# Patient Record
Sex: MALE | URBAN - METROPOLITAN AREA
[De-identification: names, ages, dates, MRNs, and addresses within clinical notes are randomized per-mention and may not be internally consistent; named-entity substitution may affect disease eponyms.]

---

## 2019-11-01 ENCOUNTER — OUTPATIENT (OUTPATIENT)
Dept: OUTPATIENT SERVICES | Facility: HOSPITAL | Age: 49
LOS: 1 days | End: 2019-11-01
Payer: MEDICAID

## 2019-12-09 DIAGNOSIS — Z71.89 OTHER SPECIFIED COUNSELING: ICD-10-CM

## 2020-01-01 ENCOUNTER — OUTPATIENT (OUTPATIENT)
Dept: OUTPATIENT SERVICES | Facility: HOSPITAL | Age: 50
LOS: 1 days | End: 2020-01-01
Payer: MEDICAID

## 2020-01-01 PROCEDURE — H0002: CPT

## 2020-03-12 PROBLEM — Z00.00 ENCOUNTER FOR PREVENTIVE HEALTH EXAMINATION: Status: ACTIVE | Noted: 2020-03-12

## 2020-03-23 ENCOUNTER — APPOINTMENT (OUTPATIENT)
Dept: ORTHOPEDIC SURGERY | Facility: CLINIC | Age: 50
End: 2020-03-23

## 2020-04-22 DIAGNOSIS — Z71.89 OTHER SPECIFIED COUNSELING: ICD-10-CM

## 2020-06-09 ENCOUNTER — RESULT REVIEW (OUTPATIENT)
Age: 50
End: 2020-06-09

## 2020-06-09 ENCOUNTER — OUTPATIENT (OUTPATIENT)
Dept: OUTPATIENT SERVICES | Facility: HOSPITAL | Age: 50
LOS: 1 days | End: 2020-06-09
Payer: COMMERCIAL

## 2020-06-09 ENCOUNTER — APPOINTMENT (OUTPATIENT)
Dept: ORTHOPEDIC SURGERY | Facility: CLINIC | Age: 50
End: 2020-06-09
Payer: MEDICAID

## 2020-06-09 VITALS — HEART RATE: 85 BPM | SYSTOLIC BLOOD PRESSURE: 130 MMHG | DIASTOLIC BLOOD PRESSURE: 70 MMHG | OXYGEN SATURATION: 96 %

## 2020-06-09 VITALS — HEIGHT: 69 IN | BODY MASS INDEX: 31.1 KG/M2 | WEIGHT: 210 LBS

## 2020-06-09 DIAGNOSIS — Z87.891 PERSONAL HISTORY OF NICOTINE DEPENDENCE: ICD-10-CM

## 2020-06-09 DIAGNOSIS — Z78.9 OTHER SPECIFIED HEALTH STATUS: ICD-10-CM

## 2020-06-09 DIAGNOSIS — Z60.2 PROBLEMS RELATED TO LIVING ALONE: ICD-10-CM

## 2020-06-09 DIAGNOSIS — Z87.898 PERSONAL HISTORY OF OTHER SPECIFIED CONDITIONS: ICD-10-CM

## 2020-06-09 DIAGNOSIS — Z80.9 FAMILY HISTORY OF MALIGNANT NEOPLASM, UNSPECIFIED: ICD-10-CM

## 2020-06-09 PROCEDURE — 72020 X-RAY EXAM OF SPINE 1 VIEW: CPT | Mod: 26

## 2020-06-09 PROCEDURE — 72020 X-RAY EXAM OF SPINE 1 VIEW: CPT

## 2020-06-09 PROCEDURE — 73564 X-RAY EXAM KNEE 4 OR MORE: CPT

## 2020-06-09 PROCEDURE — 71046 X-RAY EXAM CHEST 2 VIEWS: CPT | Mod: 26

## 2020-06-09 PROCEDURE — 73564 X-RAY EXAM KNEE 4 OR MORE: CPT | Mod: 26,RT

## 2020-06-09 PROCEDURE — 72170 X-RAY EXAM OF PELVIS: CPT

## 2020-06-09 PROCEDURE — 71046 X-RAY EXAM CHEST 2 VIEWS: CPT

## 2020-06-09 PROCEDURE — 72170 X-RAY EXAM OF PELVIS: CPT | Mod: 26,59

## 2020-06-09 PROCEDURE — 73521 X-RAY EXAM HIPS BI 2 VIEWS: CPT

## 2020-06-09 PROCEDURE — 99205 OFFICE O/P NEW HI 60 MIN: CPT

## 2020-06-09 PROCEDURE — 73523 X-RAY EXAM HIPS BI 5/> VIEWS: CPT | Mod: 26

## 2020-06-09 RX ORDER — OXYCODONE 10 MG/1
10 TABLET ORAL
Refills: 0 | Status: ACTIVE | COMMUNITY

## 2020-06-09 RX ORDER — IBUPROFEN 800 MG/1
TABLET, FILM COATED ORAL
Refills: 0 | Status: ACTIVE | COMMUNITY

## 2020-06-09 SDOH — SOCIAL STABILITY - SOCIAL INSECURITY: PROBLEMS RELATED TO LIVING ALONE: Z60.2

## 2020-06-09 NOTE — DISCUSSION/SUMMARY
[de-identified] : 48y/o male with severe right hip osteonecrosis and secondary degenerative joint disease, as well as left hip osteoarthritis and left sciatica\par - Discussed the diagnoses, natural history, and treatment options with him at length. Due to his severe pain and loss of function, he is indicated for right total hip replacement.\par - A discussion was had with the patient regarding a right total hip replacement. Anterior and posterior exposures were discussed. I think that an anterior approach would be appropriate. A long discussion was had with the patient as what the total joint replacement would entail. A model was used to demonstrate the operation and to discuss bearing surfaces of the implants. Implant fixation methods were discussed; my plan is fully cementless fixation in this case. The hospitalization and rehabilitation were discussed. The use of perioperative antibiotics and DVT prophylaxis were discussed. The risks, benefits and alternatives to surgical intervention were discussed at length with the patient. Specific risks discussed included: infection, wound breakdown, numbness and damage to nerves, tendon, muscle, arteries or other blood vessels, and the possibility of leg length discrepancy. The possibility of recurrent pain, no improvement in pain and actual worsening of the pain were also mentioned in conversation with the patient. Medical complications related to the patient's general medical health including deep vein thrombosis, pulmonary embolus, heart attack, stroke, death and other complications from anesthesia were discussed as well. The patient was told that we will take steps to minimize these risks by using sterile technique, antibiotics and DVT prophylaxis when appropriate and following the patient postoperatively in the clinic setting to monitor progress. The benefits of surgery were discussed with the patient including the potential to improve the current clinical condition through operative intervention. Alternatives to surgical intervention include continued conservative management which may yield less than optimal results in this particular patient. All questions were answered to the satisfaction of the patient. \par - We discussed the importance of ongoing smoking cessation as well as abstinence from excessive alcohol consumption and illicit drug use. Cotinine and illicit drug panel will be obtained preoperatively.\par - I explained that the right BERNARD will not address the left sided symptoms, including the sciatica. \par - Preop medical clearance and joints class\par - Will schedule surgery for a convenient date\par - He was advised to follow up with his pain management physician prior to the operation for further sciatica treatment. He declined referral to another spine specialist.

## 2020-06-09 NOTE — HISTORY OF PRESENT ILLNESS
[Worsening] : worsening [___ yrs] : [unfilled] year(s) ago [8] : a current pain level of 8/10 [Standing] : standing [Bending] : worsened by bending [Constant] : ~He/She~ states the symptoms seem to be constant [Hip Movement] : worsened by hip movement [Sitting] : worsened by sitting [Ice] : relieved by ice [Walking] : worsened by walking [Rest] : relieved by rest [de-identified] : 48y/o male presenting for right greater than left hip pain and stiffness. Symptoms have been present since the spring of 2018. No significant injury that he can recall; no other known inciting event. In the latter part of 2018, he was receiving treatment at a different hospital system including home exercise and right hip injections, which he did not find helpful. He was scheduled to undergo right total hip replacement but backed out at the last minute due to fear of the spinal anesthesia. Symptoms have progressed to the point that he is completely reliant on a rollator at all times. He is disabled. He is unable to walk for more than a fraction of a block at a time. He takes ibuprofen, gabapentin, and oxycodone daily for pain. \par \par He reports over a year of severe left sided sciatica that has waxed and waned. He sees a pain management physician and is planning to see him again within a month.\par \par He reports that he has been an alcoholic for most of his adult life. He was a half-pack per day smoker for about 37 years. He also snorted cocaine for years. He denies any prior injection drug use. He has been in and out of rehab for substance abuse. He reports that he has been completely off of all these substances for the past two months.

## 2020-06-09 NOTE — PHYSICAL EXAM
[de-identified] : General appearance: well nourished and hydrated, pleasant, alert and oriented x 3, cooperative.  \par HEENT: normocephalic, EOM intact, nasal septum midline, oral cavity clear, external auditory canal clear.  \par Cardiovascular: no lower leg edema, no varicosities, dorsalis pedis pulses palpable and symmetric.  Fingernails clubbed.\par Lymphatics: no palpable lymphadenopathy, no lymphedema.  \par Neurologic: sensation is normal, no muscle weakness in upper or lower extremities, patella tendon reflexes present and symmetric.  \par Dermatologic: skin moist, warm, no rash.  \par Spine: cervical spine with normal lordosis and painless range of motion, thoracic spine with normal kyphosis and painless range of motion, lumbosacral spine with normal lordosis and painless range of motion.  No tenderness to palpation along midline spine and paraspinal musculature.  Sacroiliac joints nontender bilaterally.\par Gait: severe bilateral coxalgia with rollator. Hunched forward posture during gait.\par \par Difficult to assess clinical limb lengths secondary to right hip flexion contracture.\par \par Left hip:\par - Skin intact, no scars or other prior surgical incisions noted\par - No swelling/ecchymosis\par - No specific tenderness on palpation\par - ROM: 120 flexion, 0 extension, 0 adduction, 30 abduction, 5 obligate extenal rotation, 60 external rotation\par - KIKE painful\par - FADIR painful\par - Booker positive\par - Stinchfield positive\par - Flexor power 5/5\par - Abductor power 5/5\par \par Right hip:\par - Skin intact, no scars or other prior surgical incisions noted\par - No swelling/ecchymosis\par - Diffuse tenderness on palpation\par - ROM: 100 flexion, 30 extension (flexion contracture), 0 adduction, 10 abduction, 10 obligate external rotation, 35 further external rotation\par - KIKE painful\par - FADIR painful\par - Booker positive\par - Stinchfield positive\par - Flexor power 4+/5, limited by pain\par - Abductor power 4+/5, limited by pain [de-identified] : A lateral view of the lumbosacral spine, weightbearing AP pelvis, and 2 additional views (frog lateral and false profile) of the bilateral hips as well as 4 views of the right knee (weightbearing AP, weightbearing [place], weightbearing lateral, and [place]) were obtained today.\par \par Right hip demonstrates advanced osteonecrosis with secondary degenerative joint disease. Loss of superior femoral head contour and sharon collapse. Large cysts on both sides of the joint. No acute fracture.\par \par Left hip demonstrates advanced osteoarthritis with bone on bone superior articulation. No obvious osteonecrosis. Detached left os acetabulum. Abnormal organized bone formation along posteromedial femoral diaphysis; appears old. \par \par Bilateral sacroiliac joints appear normal without arthrosis.\par \par The right knee demonstrates normal alignment in the coronal and sagittal planes. There is no arthritis in the medial or lateral compartments, Kellgren-Jermaine 0. The patella sits at appropriate height and tracks centrally.\par \par The spine demonstrates normal lordosis, though there is flexed forward positioning on the upright radiograph consistent with hip flexion contracture. There is multilevel spondylosis at the caudal levels associated with facet hypertrophy. There is grade 1 anterolisthesis at L4/5 and L5/S1.

## 2020-07-02 ENCOUNTER — APPOINTMENT (OUTPATIENT)
Dept: ORTHOPEDIC SURGERY | Facility: CLINIC | Age: 50
End: 2020-07-02
Payer: MEDICAID

## 2020-07-06 ENCOUNTER — OUTPATIENT (OUTPATIENT)
Dept: OUTPATIENT SERVICES | Facility: HOSPITAL | Age: 50
LOS: 1 days | End: 2020-07-06
Payer: COMMERCIAL

## 2020-07-06 ENCOUNTER — APPOINTMENT (OUTPATIENT)
Dept: ORTHOPEDIC SURGERY | Facility: CLINIC | Age: 50
End: 2020-07-06
Payer: MEDICAID

## 2020-07-06 ENCOUNTER — RESULT REVIEW (OUTPATIENT)
Age: 50
End: 2020-07-06

## 2020-07-06 VITALS
WEIGHT: 210 LBS | TEMPERATURE: 97.8 F | SYSTOLIC BLOOD PRESSURE: 140 MMHG | OXYGEN SATURATION: 97 % | DIASTOLIC BLOOD PRESSURE: 70 MMHG | HEIGHT: 69 IN | BODY MASS INDEX: 31.1 KG/M2 | HEART RATE: 88 BPM

## 2020-07-06 DIAGNOSIS — Z01.818 ENCOUNTER FOR OTHER PREPROCEDURAL EXAMINATION: ICD-10-CM

## 2020-07-06 DIAGNOSIS — M16.12 UNILATERAL PRIMARY OSTEOARTHRITIS, LEFT HIP: ICD-10-CM

## 2020-07-06 LAB
ALBUMIN SERPL ELPH-MCNC: 4.4 G/DL — SIGNIFICANT CHANGE UP (ref 3.3–5)
ALP SERPL-CCNC: 95 U/L — SIGNIFICANT CHANGE UP (ref 40–120)
ALT FLD-CCNC: 24 U/L — SIGNIFICANT CHANGE UP (ref 10–45)
ANION GAP SERPL CALC-SCNC: 12 MMOL/L — SIGNIFICANT CHANGE UP (ref 5–17)
APPEARANCE UR: CLEAR — SIGNIFICANT CHANGE UP
APTT BLD: 33.2 SEC — SIGNIFICANT CHANGE UP (ref 27.5–35.5)
AST SERPL-CCNC: 20 U/L — SIGNIFICANT CHANGE UP (ref 10–40)
BACTERIA # UR AUTO: PRESENT /HPF
BASOPHILS # BLD AUTO: 0.04 K/UL — SIGNIFICANT CHANGE UP (ref 0–0.2)
BASOPHILS NFR BLD AUTO: 0.6 % — SIGNIFICANT CHANGE UP (ref 0–2)
BILIRUB SERPL-MCNC: 0.5 MG/DL — SIGNIFICANT CHANGE UP (ref 0.2–1.2)
BILIRUB UR-MCNC: NEGATIVE — SIGNIFICANT CHANGE UP
BUN SERPL-MCNC: 12 MG/DL — SIGNIFICANT CHANGE UP (ref 7–23)
CALCIUM SERPL-MCNC: 10.2 MG/DL — SIGNIFICANT CHANGE UP (ref 8.4–10.5)
CHLORIDE SERPL-SCNC: 101 MMOL/L — SIGNIFICANT CHANGE UP (ref 96–108)
CO2 SERPL-SCNC: 27 MMOL/L — SIGNIFICANT CHANGE UP (ref 22–31)
COLOR SPEC: YELLOW — SIGNIFICANT CHANGE UP
CREAT SERPL-MCNC: 1.02 MG/DL — SIGNIFICANT CHANGE UP (ref 0.5–1.3)
DIFF PNL FLD: ABNORMAL
EOSINOPHIL # BLD AUTO: 0.14 K/UL — SIGNIFICANT CHANGE UP (ref 0–0.5)
EOSINOPHIL NFR BLD AUTO: 2.2 % — SIGNIFICANT CHANGE UP (ref 0–6)
EPI CELLS # UR: SIGNIFICANT CHANGE UP /HPF (ref 0–5)
GLUCOSE SERPL-MCNC: 88 MG/DL — SIGNIFICANT CHANGE UP (ref 70–99)
GLUCOSE UR QL: NEGATIVE — SIGNIFICANT CHANGE UP
HCT VFR BLD CALC: 41.9 % — SIGNIFICANT CHANGE UP (ref 39–50)
HGB BLD-MCNC: 13.7 G/DL — SIGNIFICANT CHANGE UP (ref 13–17)
IMM GRANULOCYTES NFR BLD AUTO: 0.3 % — SIGNIFICANT CHANGE UP (ref 0–1.5)
INR BLD: 0.98 — SIGNIFICANT CHANGE UP (ref 0.88–1.16)
KETONES UR-MCNC: NEGATIVE — SIGNIFICANT CHANGE UP
LEUKOCYTE ESTERASE UR-ACNC: ABNORMAL
LYMPHOCYTES # BLD AUTO: 2.61 K/UL — SIGNIFICANT CHANGE UP (ref 1–3.3)
LYMPHOCYTES # BLD AUTO: 41.2 % — SIGNIFICANT CHANGE UP (ref 13–44)
MCHC RBC-ENTMCNC: 31.5 PG — SIGNIFICANT CHANGE UP (ref 27–34)
MCHC RBC-ENTMCNC: 32.7 GM/DL — SIGNIFICANT CHANGE UP (ref 32–36)
MCV RBC AUTO: 96.3 FL — SIGNIFICANT CHANGE UP (ref 80–100)
MONOCYTES # BLD AUTO: 0.51 K/UL — SIGNIFICANT CHANGE UP (ref 0–0.9)
MONOCYTES NFR BLD AUTO: 8 % — SIGNIFICANT CHANGE UP (ref 2–14)
NEUTROPHILS # BLD AUTO: 3.02 K/UL — SIGNIFICANT CHANGE UP (ref 1.8–7.4)
NEUTROPHILS NFR BLD AUTO: 47.7 % — SIGNIFICANT CHANGE UP (ref 43–77)
NITRITE UR-MCNC: NEGATIVE — SIGNIFICANT CHANGE UP
NRBC # BLD: 0 /100 WBCS — SIGNIFICANT CHANGE UP (ref 0–0)
PH UR: 6 — SIGNIFICANT CHANGE UP (ref 5–8)
PLATELET # BLD AUTO: 442 K/UL — HIGH (ref 150–400)
POTASSIUM SERPL-MCNC: 4.2 MMOL/L — SIGNIFICANT CHANGE UP (ref 3.5–5.3)
POTASSIUM SERPL-SCNC: 4.2 MMOL/L — SIGNIFICANT CHANGE UP (ref 3.5–5.3)
PROT SERPL-MCNC: 7.1 G/DL — SIGNIFICANT CHANGE UP (ref 6–8.3)
PROT UR-MCNC: NEGATIVE MG/DL — SIGNIFICANT CHANGE UP
PROTHROM AB SERPL-ACNC: 11.8 SEC — SIGNIFICANT CHANGE UP (ref 10.6–13.6)
RBC # BLD: 4.35 M/UL — SIGNIFICANT CHANGE UP (ref 4.2–5.8)
RBC # FLD: 11.9 % — SIGNIFICANT CHANGE UP (ref 10.3–14.5)
RBC CASTS # UR COMP ASSIST: < 5 /HPF — SIGNIFICANT CHANGE UP
SODIUM SERPL-SCNC: 140 MMOL/L — SIGNIFICANT CHANGE UP (ref 135–145)
SP GR SPEC: <=1.005 — SIGNIFICANT CHANGE UP (ref 1–1.03)
UROBILINOGEN FLD QL: 0.2 E.U./DL — SIGNIFICANT CHANGE UP
WBC # BLD: 6.34 K/UL — SIGNIFICANT CHANGE UP (ref 3.8–10.5)
WBC # FLD AUTO: 6.34 K/UL — SIGNIFICANT CHANGE UP (ref 3.8–10.5)
WBC UR QL: < 5 /HPF — SIGNIFICANT CHANGE UP

## 2020-07-06 PROCEDURE — 93010 ELECTROCARDIOGRAM REPORT: CPT

## 2020-07-06 PROCEDURE — 71046 X-RAY EXAM CHEST 2 VIEWS: CPT | Mod: 26

## 2020-07-06 PROCEDURE — 85730 THROMBOPLASTIN TIME PARTIAL: CPT

## 2020-07-06 PROCEDURE — 71046 X-RAY EXAM CHEST 2 VIEWS: CPT

## 2020-07-06 PROCEDURE — 99214 OFFICE O/P EST MOD 30 MIN: CPT

## 2020-07-06 PROCEDURE — 85025 COMPLETE CBC W/AUTO DIFF WBC: CPT

## 2020-07-06 PROCEDURE — 81001 URINALYSIS AUTO W/SCOPE: CPT

## 2020-07-06 PROCEDURE — 93005 ELECTROCARDIOGRAM TRACING: CPT

## 2020-07-06 PROCEDURE — 80053 COMPREHEN METABOLIC PANEL: CPT

## 2020-07-06 PROCEDURE — 85610 PROTHROMBIN TIME: CPT

## 2020-07-06 PROCEDURE — 87086 URINE CULTURE/COLONY COUNT: CPT

## 2020-07-07 LAB
CULTURE RESULTS: NO GROWTH — SIGNIFICANT CHANGE UP
SPECIMEN SOURCE: SIGNIFICANT CHANGE UP

## 2020-07-10 LAB
COTININE SERPL-MCNC: ABNORMAL
DRUG ABUSE PANEL-9, SERUM: NORMAL

## 2020-07-13 NOTE — ADDENDUM
[FreeTextEntry1] : Labs/EKG/Imaging screen reviewed.  No acute abnormalities.  Patient with positive cotinine. Per Ortho, will  delay surgery to 8/14 with repeat testing prior.\par

## 2020-07-13 NOTE — HISTORY OF PRESENT ILLNESS
[Chronic Anticoagulation] : no chronic anticoagulation [Chronic Kidney Disease] : no chronic kidney disease [FreeTextEntry1] : Right Total Hip Arthroplasty [Diabetes] : no diabetes [FreeTextEntry2] : 7/17/2020 [FreeTextEntry3] : Dr. 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 The patient is planned for Right Total Hip Arthroplasty with Dr. Jeremías Longoria on 7/17/2020.\par \par METS >4\par \par The patient denies recent/active cardiopulmonary symptoms including chest pain, shortness of breath, dyspnea of exertion, palpitations, swelling, headache, dizziness, syncope.\par \par The patient denies recent COVID exposure or COVID-related symptoms.\par \par

## 2020-07-13 NOTE — RESULTS/DATA
[de-identified] : pending [de-identified] : pending [de-identified] : pending [de-identified] : pending [de-identified] : pending UA, COVID PCR

## 2020-07-13 NOTE — ASSESSMENT
[Patient Optimized for Surgery] : Patient optimized for surgery [FreeTextEntry4] : The patient is a 49 year old man presenting with right hip pain secondary to hip avascular necrosis . The patient is planned for Right Total Hip Arthroplasty with Dr. Jeremías Longoria on 7/17/2020.\par \par -Labs/Diagnostics reviewed with patient in detail\par -METS >4\par -RCRI = 0, 3.9% 30-day risk of death/MI/cardiac arrest\par \par Patient is at low risk for moderate risk surgery/procedure\par \par Pending labs/diagnostics, Patient is MEDICALLY OPTIMIZED TO PROCEED WITH PROPOSED SURGERY.\par \par \par \par

## 2020-08-01 ENCOUNTER — HOSPITAL ENCOUNTER (INPATIENT)
Dept: HOSPITAL 74 - YASAS | Age: 50
LOS: 4 days | Discharge: OTHER INSTITUTION | End: 2020-08-05
Attending: ALLERGY & IMMUNOLOGY | Admitting: ALLERGY & IMMUNOLOGY
Payer: COMMERCIAL

## 2020-08-01 VITALS — BODY MASS INDEX: 32.5 KG/M2

## 2020-08-01 DIAGNOSIS — F10.230: Primary | ICD-10-CM

## 2020-08-01 DIAGNOSIS — E66.9: ICD-10-CM

## 2020-08-01 DIAGNOSIS — I10: ICD-10-CM

## 2020-08-01 DIAGNOSIS — R26.2: ICD-10-CM

## 2020-08-01 DIAGNOSIS — F14.20: ICD-10-CM

## 2020-08-01 DIAGNOSIS — F19.24: ICD-10-CM

## 2020-08-01 DIAGNOSIS — Z91.013: ICD-10-CM

## 2020-08-01 DIAGNOSIS — M16.0: ICD-10-CM

## 2020-08-01 DIAGNOSIS — Z99.89: ICD-10-CM

## 2020-08-01 DIAGNOSIS — F17.213: ICD-10-CM

## 2020-08-01 PROCEDURE — U0003 INFECTIOUS AGENT DETECTION BY NUCLEIC ACID (DNA OR RNA); SEVERE ACUTE RESPIRATORY SYNDROME CORONAVIRUS 2 (SARS-COV-2) (CORONAVIRUS DISEASE [COVID-19]), AMPLIFIED PROBE TECHNIQUE, MAKING USE OF HIGH THROUGHPUT TECHNOLOGIES AS DESCRIBED BY CMS-2020-01-R: HCPCS

## 2020-08-01 PROCEDURE — HZ2ZZZZ DETOXIFICATION SERVICES FOR SUBSTANCE ABUSE TREATMENT: ICD-10-PCS | Performed by: ALLERGY & IMMUNOLOGY

## 2020-08-01 RX ADMIN — Medication SCH MG: at 22:22

## 2020-08-01 RX ADMIN — HYDROXYZINE PAMOATE SCH MG: 25 CAPSULE ORAL at 21:11

## 2020-08-01 RX ADMIN — GABAPENTIN SCH MG: 300 CAPSULE ORAL at 21:12

## 2020-08-01 RX ADMIN — Medication SCH MG: at 21:12

## 2020-08-02 LAB
ALBUMIN SERPL-MCNC: 3.4 G/DL (ref 3.4–5)
ALP SERPL-CCNC: 84 U/L (ref 45–117)
ALT SERPL-CCNC: 20 U/L (ref 13–61)
ANION GAP SERPL CALC-SCNC: 0 MMOL/L (ref 8–16)
AST SERPL-CCNC: 15 U/L (ref 15–37)
BILIRUB SERPL-MCNC: 0.7 MG/DL (ref 0.2–1)
BUN SERPL-MCNC: 15.5 MG/DL (ref 7–18)
CALCIUM SERPL-MCNC: 9 MG/DL (ref 8.5–10.1)
CHLORIDE SERPL-SCNC: 114 MMOL/L (ref 98–107)
CO2 SERPL-SCNC: 29 MMOL/L (ref 21–32)
CREAT SERPL-MCNC: 1 MG/DL (ref 0.55–1.3)
DEPRECATED RDW RBC AUTO: 13.6 % (ref 11.9–15.9)
GLUCOSE SERPL-MCNC: 91 MG/DL (ref 74–106)
HCT VFR BLD CALC: 41.2 % (ref 35.4–49)
HGB BLD-MCNC: 13.4 GM/DL (ref 11.7–16.9)
MCH RBC QN AUTO: 32.2 PG (ref 25.7–33.7)
MCHC RBC AUTO-ENTMCNC: 32.6 G/DL (ref 32–35.9)
MCV RBC: 98.9 FL (ref 80–96)
PLATELET # BLD AUTO: 338 K/MM3 (ref 134–434)
PMV BLD: 8.4 FL (ref 7.5–11.1)
POTASSIUM SERPLBLD-SCNC: 4 MMOL/L (ref 3.5–5.1)
PROT SERPL-MCNC: 6.5 G/DL (ref 6.4–8.2)
RBC # BLD AUTO: 4.17 M/MM3 (ref 4–5.6)
SODIUM SERPL-SCNC: 143 MMOL/L (ref 136–145)
WBC # BLD AUTO: 4.6 K/MM3 (ref 4–10)

## 2020-08-02 RX ADMIN — Medication SCH MG: at 22:22

## 2020-08-02 RX ADMIN — GABAPENTIN SCH MG: 300 CAPSULE ORAL at 22:22

## 2020-08-02 RX ADMIN — AMLODIPINE BESYLATE SCH MG: 10 TABLET ORAL at 10:22

## 2020-08-02 RX ADMIN — IBUPROFEN PRN MG: 400 TABLET, FILM COATED ORAL at 05:28

## 2020-08-02 RX ADMIN — HYDROXYZINE PAMOATE SCH: 25 CAPSULE ORAL at 10:48

## 2020-08-02 RX ADMIN — GABAPENTIN SCH MG: 300 CAPSULE ORAL at 14:02

## 2020-08-02 RX ADMIN — GABAPENTIN SCH MG: 300 CAPSULE ORAL at 05:27

## 2020-08-02 RX ADMIN — Medication SCH TAB: at 10:23

## 2020-08-02 RX ADMIN — HYDROXYZINE PAMOATE SCH MG: 25 CAPSULE ORAL at 05:28

## 2020-08-03 RX ADMIN — GABAPENTIN SCH MG: 300 CAPSULE ORAL at 13:05

## 2020-08-03 RX ADMIN — Medication SCH TAB: at 10:48

## 2020-08-03 RX ADMIN — Medication SCH MG: at 22:10

## 2020-08-03 RX ADMIN — LISINOPRIL SCH MG: 10 TABLET ORAL at 22:10

## 2020-08-03 RX ADMIN — Medication SCH MG: at 22:11

## 2020-08-03 RX ADMIN — LISINOPRIL SCH MG: 10 TABLET ORAL at 13:04

## 2020-08-03 RX ADMIN — GABAPENTIN SCH MG: 300 CAPSULE ORAL at 05:32

## 2020-08-03 RX ADMIN — AMLODIPINE BESYLATE SCH MG: 10 TABLET ORAL at 10:49

## 2020-08-03 RX ADMIN — IBUPROFEN PRN MG: 400 TABLET, FILM COATED ORAL at 05:32

## 2020-08-03 RX ADMIN — GABAPENTIN SCH MG: 300 CAPSULE ORAL at 22:10

## 2020-08-04 LAB
APPEARANCE UR: CLEAR
BILIRUB UR STRIP.AUTO-MCNC: NEGATIVE MG/DL
COLOR UR: YELLOW
KETONES UR QL STRIP: NEGATIVE
LEUKOCYTE ESTERASE UR QL STRIP.AUTO: NEGATIVE
NITRITE UR QL STRIP: NEGATIVE
PH UR: 8 [PH] (ref 5–8)
PROT UR QL STRIP: NEGATIVE
PROT UR QL STRIP: NEGATIVE
SP GR UR: 1.02 (ref 1.01–1.03)
UROBILINOGEN UR STRIP-MCNC: 0.2 MG/DL (ref 0.2–1)

## 2020-08-04 RX ADMIN — GABAPENTIN SCH MG: 300 CAPSULE ORAL at 05:22

## 2020-08-04 RX ADMIN — LISINOPRIL SCH MG: 10 TABLET ORAL at 10:10

## 2020-08-04 RX ADMIN — IBUPROFEN PRN MG: 400 TABLET, FILM COATED ORAL at 06:14

## 2020-08-04 RX ADMIN — GABAPENTIN SCH MG: 300 CAPSULE ORAL at 22:44

## 2020-08-04 RX ADMIN — AMLODIPINE BESYLATE SCH MG: 10 TABLET ORAL at 10:10

## 2020-08-04 RX ADMIN — Medication SCH TAB: at 10:09

## 2020-08-04 RX ADMIN — GABAPENTIN SCH MG: 300 CAPSULE ORAL at 14:46

## 2020-08-04 RX ADMIN — Medication SCH MG: at 22:44

## 2020-08-05 ENCOUNTER — HOSPITAL ENCOUNTER (INPATIENT)
Dept: HOSPITAL 74 - YASAS | Age: 50
LOS: 6 days | Discharge: HOME | DRG: 772 | End: 2020-08-11
Attending: ALLERGY & IMMUNOLOGY | Admitting: ALLERGY & IMMUNOLOGY
Payer: COMMERCIAL

## 2020-08-05 VITALS — SYSTOLIC BLOOD PRESSURE: 107 MMHG | TEMPERATURE: 97.3 F | DIASTOLIC BLOOD PRESSURE: 80 MMHG | HEART RATE: 118 BPM

## 2020-08-05 DIAGNOSIS — M16.0: ICD-10-CM

## 2020-08-05 DIAGNOSIS — R26.2: ICD-10-CM

## 2020-08-05 DIAGNOSIS — F14.20: ICD-10-CM

## 2020-08-05 DIAGNOSIS — F17.210: ICD-10-CM

## 2020-08-05 DIAGNOSIS — F10.20: Primary | ICD-10-CM

## 2020-08-05 DIAGNOSIS — J30.89: ICD-10-CM

## 2020-08-05 DIAGNOSIS — Z99.89: ICD-10-CM

## 2020-08-05 PROCEDURE — HZ42ZZZ GROUP COUNSELING FOR SUBSTANCE ABUSE TREATMENT, COGNITIVE-BEHAVIORAL: ICD-10-PCS | Performed by: ALLERGY & IMMUNOLOGY

## 2020-08-05 RX ADMIN — GABAPENTIN SCH MG: 300 CAPSULE ORAL at 21:46

## 2020-08-05 RX ADMIN — GABAPENTIN SCH MG: 300 CAPSULE ORAL at 06:18

## 2020-08-05 RX ADMIN — LISINOPRIL SCH MG: 20 TABLET ORAL at 21:47

## 2020-08-05 RX ADMIN — Medication SCH MG: at 21:45

## 2020-08-05 RX ADMIN — AMLODIPINE BESYLATE SCH MG: 10 TABLET ORAL at 10:44

## 2020-08-05 RX ADMIN — Medication SCH TAB: at 10:44

## 2020-08-05 RX ADMIN — HYDROXYZINE PAMOATE SCH: 25 CAPSULE ORAL at 17:59

## 2020-08-05 RX ADMIN — IBUPROFEN PRN MG: 400 TABLET, FILM COATED ORAL at 21:48

## 2020-08-05 RX ADMIN — GABAPENTIN SCH: 300 CAPSULE ORAL at 14:38

## 2020-08-05 RX ADMIN — GABAPENTIN SCH MG: 300 CAPSULE ORAL at 21:45

## 2020-08-05 RX ADMIN — HYDROXYZINE PAMOATE SCH MG: 25 CAPSULE ORAL at 21:45

## 2020-08-05 RX ADMIN — IBUPROFEN PRN MG: 400 TABLET, FILM COATED ORAL at 06:18

## 2020-08-06 RX ADMIN — GABAPENTIN SCH: 300 CAPSULE ORAL at 23:41

## 2020-08-06 RX ADMIN — Medication SCH TAB: at 09:54

## 2020-08-06 RX ADMIN — LISINOPRIL SCH MG: 20 TABLET ORAL at 09:54

## 2020-08-06 RX ADMIN — HYDROXYZINE PAMOATE SCH: 25 CAPSULE ORAL at 09:55

## 2020-08-06 RX ADMIN — GABAPENTIN SCH MG: 300 CAPSULE ORAL at 06:26

## 2020-08-06 RX ADMIN — IBUPROFEN PRN MG: 400 TABLET, FILM COATED ORAL at 06:26

## 2020-08-06 RX ADMIN — LISINOPRIL SCH: 20 TABLET ORAL at 23:41

## 2020-08-06 RX ADMIN — Medication SCH: at 23:41

## 2020-08-06 RX ADMIN — GABAPENTIN SCH MG: 300 CAPSULE ORAL at 13:48

## 2020-08-06 RX ADMIN — IBUPROFEN PRN MG: 400 TABLET, FILM COATED ORAL at 13:50

## 2020-08-06 RX ADMIN — AMLODIPINE BESYLATE SCH MG: 10 TABLET ORAL at 09:54

## 2020-08-06 RX ADMIN — NICOTINE SCH: 7 PATCH TRANSDERMAL at 09:55

## 2020-08-06 RX ADMIN — HYDROXYZINE PAMOATE SCH MG: 25 CAPSULE ORAL at 06:26

## 2020-08-07 RX ADMIN — GABAPENTIN SCH MG: 300 CAPSULE ORAL at 06:43

## 2020-08-07 RX ADMIN — ACETAMINOPHEN PRN MG: 325 TABLET ORAL at 13:37

## 2020-08-07 RX ADMIN — Medication SCH MG: at 21:19

## 2020-08-07 RX ADMIN — NICOTINE SCH: 7 PATCH TRANSDERMAL at 10:04

## 2020-08-07 RX ADMIN — GABAPENTIN SCH MG: 300 CAPSULE ORAL at 13:36

## 2020-08-07 RX ADMIN — LISINOPRIL SCH MG: 20 TABLET ORAL at 21:19

## 2020-08-07 RX ADMIN — LISINOPRIL SCH MG: 20 TABLET ORAL at 10:04

## 2020-08-07 RX ADMIN — ACETAMINOPHEN PRN MG: 325 TABLET ORAL at 21:22

## 2020-08-07 RX ADMIN — IBUPROFEN PRN MG: 400 TABLET, FILM COATED ORAL at 19:31

## 2020-08-07 RX ADMIN — Medication SCH TAB: at 10:04

## 2020-08-07 RX ADMIN — IBUPROFEN PRN MG: 400 TABLET, FILM COATED ORAL at 01:41

## 2020-08-07 RX ADMIN — AMLODIPINE BESYLATE SCH MG: 10 TABLET ORAL at 10:04

## 2020-08-07 RX ADMIN — IBUPROFEN PRN MG: 400 TABLET, FILM COATED ORAL at 10:05

## 2020-08-07 RX ADMIN — GABAPENTIN SCH MG: 300 CAPSULE ORAL at 21:19

## 2020-08-08 RX ADMIN — GABAPENTIN SCH MG: 300 CAPSULE ORAL at 22:05

## 2020-08-08 RX ADMIN — Medication SCH TAB: at 09:15

## 2020-08-08 RX ADMIN — AMLODIPINE BESYLATE SCH MG: 10 TABLET ORAL at 09:14

## 2020-08-08 RX ADMIN — IBUPROFEN PRN MG: 400 TABLET, FILM COATED ORAL at 23:43

## 2020-08-08 RX ADMIN — NICOTINE SCH: 7 PATCH TRANSDERMAL at 09:15

## 2020-08-08 RX ADMIN — CYCLOBENZAPRINE HYDROCHLORIDE PRN MG: 10 TABLET, FILM COATED ORAL at 13:17

## 2020-08-08 RX ADMIN — Medication SCH MG: at 22:04

## 2020-08-08 RX ADMIN — LISINOPRIL SCH MG: 20 TABLET ORAL at 09:14

## 2020-08-08 RX ADMIN — HYDROXYZINE PAMOATE PRN MG: 25 CAPSULE ORAL at 22:05

## 2020-08-08 RX ADMIN — ALUMINUM HYDROXIDE, MAGNESIUM HYDROXIDE, AND SIMETHICONE PRN ML: 200; 200; 20 SUSPENSION ORAL at 23:43

## 2020-08-08 RX ADMIN — CYCLOBENZAPRINE HYDROCHLORIDE PRN MG: 10 TABLET, FILM COATED ORAL at 22:05

## 2020-08-08 RX ADMIN — LISINOPRIL SCH MG: 20 TABLET ORAL at 22:05

## 2020-08-08 RX ADMIN — IBUPROFEN PRN MG: 400 TABLET, FILM COATED ORAL at 15:03

## 2020-08-08 RX ADMIN — GABAPENTIN SCH MG: 300 CAPSULE ORAL at 13:17

## 2020-08-08 RX ADMIN — HYDROXYZINE PAMOATE PRN MG: 25 CAPSULE ORAL at 15:13

## 2020-08-08 RX ADMIN — GABAPENTIN SCH MG: 300 CAPSULE ORAL at 06:27

## 2020-08-08 RX ADMIN — ACETAMINOPHEN PRN MG: 325 TABLET ORAL at 18:29

## 2020-08-08 RX ADMIN — IBUPROFEN PRN MG: 400 TABLET, FILM COATED ORAL at 06:27

## 2020-08-08 RX ADMIN — ACETAMINOPHEN PRN MG: 325 TABLET ORAL at 09:14

## 2020-08-09 RX ADMIN — GABAPENTIN SCH MG: 300 CAPSULE ORAL at 06:24

## 2020-08-09 RX ADMIN — HYDROXYZINE PAMOATE PRN MG: 25 CAPSULE ORAL at 19:20

## 2020-08-09 RX ADMIN — GABAPENTIN SCH MG: 300 CAPSULE ORAL at 22:18

## 2020-08-09 RX ADMIN — AMLODIPINE BESYLATE SCH: 10 TABLET ORAL at 09:56

## 2020-08-09 RX ADMIN — GABAPENTIN SCH MG: 300 CAPSULE ORAL at 13:01

## 2020-08-09 RX ADMIN — LISINOPRIL SCH: 20 TABLET ORAL at 22:19

## 2020-08-09 RX ADMIN — ACETAMINOPHEN PRN MG: 325 TABLET ORAL at 09:57

## 2020-08-09 RX ADMIN — Medication SCH MG: at 22:19

## 2020-08-09 RX ADMIN — LISINOPRIL SCH: 20 TABLET ORAL at 09:56

## 2020-08-09 RX ADMIN — Medication SCH TAB: at 09:56

## 2020-08-09 RX ADMIN — CYCLOBENZAPRINE HYDROCHLORIDE PRN MG: 10 TABLET, FILM COATED ORAL at 06:24

## 2020-08-09 RX ADMIN — ALUMINUM HYDROXIDE, MAGNESIUM HYDROXIDE, AND SIMETHICONE PRN ML: 200; 200; 20 SUSPENSION ORAL at 19:17

## 2020-08-09 RX ADMIN — Medication SCH MG: at 22:18

## 2020-08-09 RX ADMIN — HYDROXYZINE PAMOATE PRN MG: 25 CAPSULE ORAL at 13:01

## 2020-08-09 RX ADMIN — IBUPROFEN PRN MG: 400 TABLET, FILM COATED ORAL at 06:24

## 2020-08-09 RX ADMIN — NICOTINE SCH: 7 PATCH TRANSDERMAL at 09:56

## 2020-08-09 RX ADMIN — IBUPROFEN PRN MG: 400 TABLET, FILM COATED ORAL at 19:18

## 2020-08-10 RX ADMIN — GABAPENTIN SCH MG: 300 CAPSULE ORAL at 22:14

## 2020-08-10 RX ADMIN — Medication SCH TAB: at 10:02

## 2020-08-10 RX ADMIN — ACETAMINOPHEN PRN MG: 325 TABLET ORAL at 22:14

## 2020-08-10 RX ADMIN — ALUMINUM HYDROXIDE, MAGNESIUM HYDROXIDE, AND SIMETHICONE PRN ML: 200; 200; 20 SUSPENSION ORAL at 22:17

## 2020-08-10 RX ADMIN — Medication SCH MG: at 22:14

## 2020-08-10 RX ADMIN — GABAPENTIN SCH MG: 300 CAPSULE ORAL at 13:17

## 2020-08-10 RX ADMIN — NICOTINE SCH: 7 PATCH TRANSDERMAL at 10:03

## 2020-08-10 RX ADMIN — LISINOPRIL SCH: 20 TABLET ORAL at 10:05

## 2020-08-10 RX ADMIN — IBUPROFEN PRN MG: 400 TABLET, FILM COATED ORAL at 06:00

## 2020-08-10 RX ADMIN — IBUPROFEN PRN MG: 400 TABLET, FILM COATED ORAL at 10:05

## 2020-08-10 RX ADMIN — HYDROXYZINE PAMOATE PRN MG: 25 CAPSULE ORAL at 10:02

## 2020-08-10 RX ADMIN — GABAPENTIN SCH MG: 300 CAPSULE ORAL at 06:00

## 2020-08-10 RX ADMIN — CYCLOBENZAPRINE HYDROCHLORIDE PRN MG: 10 TABLET, FILM COATED ORAL at 06:01

## 2020-08-10 RX ADMIN — IBUPROFEN PRN MG: 400 TABLET, FILM COATED ORAL at 17:06

## 2020-08-10 RX ADMIN — AMLODIPINE BESYLATE SCH MG: 10 TABLET ORAL at 10:45

## 2020-08-10 RX ADMIN — ACETAMINOPHEN PRN MG: 325 TABLET ORAL at 15:34

## 2020-08-11 ENCOUNTER — APPOINTMENT (OUTPATIENT)
Dept: ORTHOPEDIC SURGERY | Facility: CLINIC | Age: 50
End: 2020-08-11
Payer: MEDICAID

## 2020-08-11 VITALS — DIASTOLIC BLOOD PRESSURE: 85 MMHG | SYSTOLIC BLOOD PRESSURE: 130 MMHG | TEMPERATURE: 98.2 F | HEART RATE: 105 BPM

## 2020-08-11 VITALS
HEART RATE: 80 BPM | WEIGHT: 210 LBS | TEMPERATURE: 97.2 F | DIASTOLIC BLOOD PRESSURE: 92 MMHG | HEIGHT: 69 IN | OXYGEN SATURATION: 96 % | SYSTOLIC BLOOD PRESSURE: 140 MMHG | BODY MASS INDEX: 31.1 KG/M2

## 2020-08-11 DIAGNOSIS — Z01.818 ENCOUNTER FOR OTHER PREPROCEDURAL EXAMINATION: ICD-10-CM

## 2020-08-11 DIAGNOSIS — M87.9 OSTEONECROSIS, UNSPECIFIED: ICD-10-CM

## 2020-08-11 PROCEDURE — 99213 OFFICE O/P EST LOW 20 MIN: CPT

## 2020-08-11 RX ADMIN — IBUPROFEN PRN MG: 400 TABLET, FILM COATED ORAL at 02:51

## 2020-08-11 RX ADMIN — GABAPENTIN SCH MG: 300 CAPSULE ORAL at 06:38

## 2020-08-11 NOTE — PHYSICAL EXAM
[No Acute Distress] : no acute distress [Well Nourished] : well nourished [Well Developed] : well developed [Well-Appearing] : well-appearing [Normal Sclera/Conjunctiva] : normal sclera/conjunctiva [EOMI] : extraocular movements intact [PERRL] : pupils equal round and reactive to light [Normal Oropharynx] : the oropharynx was normal [No JVD] : no jugular venous distention [Normal Outer Ear/Nose] : the outer ears and nose were normal in appearance [No Lymphadenopathy] : no lymphadenopathy [Supple] : supple [No Respiratory Distress] : no respiratory distress  [No Accessory Muscle Use] : no accessory muscle use [Thyroid Normal, No Nodules] : the thyroid was normal and there were no nodules present [Normal Rate] : normal rate  [Clear to Auscultation] : lungs were clear to auscultation bilaterally [Regular Rhythm] : with a regular rhythm [No Carotid Bruits] : no carotid bruits [No Murmur] : no murmur heard [Normal S1, S2] : normal S1 and S2 [No Varicosities] : no varicosities [No Abdominal Bruit] : a ~M bruit was not heard ~T in the abdomen [No Palpable Aorta] : no palpable aorta [No Edema] : there was no peripheral edema [Pedal Pulses Present] : the pedal pulses are present [Soft] : abdomen soft [Non Tender] : non-tender [No Extremity Clubbing/Cyanosis] : no extremity clubbing/cyanosis [Non-distended] : non-distended [No HSM] : no HSM [No Masses] : no abdominal mass palpated [Normal Posterior Cervical Nodes] : no posterior cervical lymphadenopathy [Normal Bowel Sounds] : normal bowel sounds [No CVA Tenderness] : no CVA  tenderness [Normal Anterior Cervical Nodes] : no anterior cervical lymphadenopathy [No Rash] : no rash [No Spinal Tenderness] : no spinal tenderness [No Joint Swelling] : no joint swelling [No Focal Deficits] : no focal deficits [Coordination Grossly Intact] : coordination grossly intact [Normal Gait] : normal gait [Normal Affect] : the affect was normal [Deep Tendon Reflexes (DTR)] : deep tendon reflexes were 2+ and symmetric [Normal Insight/Judgement] : insight and judgment were intact [de-identified] : Decreased Hip ROM, + provocative signs

## 2020-08-11 NOTE — HISTORY OF PRESENT ILLNESS
[No Pertinent Cardiac History] : no history of aortic stenosis, atrial fibrillation, coronary artery disease, recent myocardial infarction, or implantable device/pacemaker [No Pertinent Pulmonary History] : no history of asthma, COPD, sleep apnea, or smoking [No Adverse Anesthesia Reaction] : no adverse anesthesia reaction in self or family member [(Patient denies any chest pain, claudication, dyspnea on exertion, orthopnea, palpitations or syncope)] : Patient denies any chest pain, claudication, dyspnea on exertion, orthopnea, palpitations or syncope [Moderate (4-6 METs)] : Moderate (4-6 METs) [Chronic Anticoagulation] : no chronic anticoagulation [Chronic Kidney Disease] : no chronic kidney disease [Diabetes] : no diabetes [FreeTextEntry1] : Right Total Hip Arthroplasty [FreeTextEntry2] : 8/21/2020 [FreeTextEntry3] : Dr. 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 The patient is planned for Right Total Hip Arthroplasty with Dr. Jeremías Longoria on 8/21/2020.\par \par The patient was previously planned for surgery in July 2020, and was seen by me, and cleared from a medical perspective, but his cotinine test was positive, and patient was instructed on smoking cessation.  He states he has been compliant with smoking cessation.  No new medical changes or medication changes since our last visit.\par \par METS >4\par \par The patient denies recent/active cardiopulmonary symptoms including chest pain, shortness of breath, dyspnea of exertion, palpitations, swelling, headache, dizziness, syncope.\par \par The patient denies recent COVID exposure or COVID-related symptoms.\par \par

## 2020-08-11 NOTE — ASSESSMENT
[Patient Optimized for Surgery] : Patient optimized for surgery [No Further Testing Recommended] : no further testing recommended [As per surgery] : as per surgery [FreeTextEntry4] : The patient is a 49 year old man presenting with right hip pain secondary to hip avascular necrosis . The patient is planned for Right Total Hip Arthroplasty with Dr. Jeremías Longoria on 8/21//2020.\par \par -Labs/Diagnostics reviewed with patient in detail\par -METS >4\par -RCRI = 0, 3.9% 30-day risk of death/MI/cardiac arrest\par \par Patient is at low risk for moderate risk surgery/procedure\par \par Pending repeat cotinine test, Patient is MEDICALLY OPTIMIZED TO PROCEED WITH PROPOSED SURGERY.\par \par \par \par

## 2020-08-13 LAB — COTININE SERPL-MCNC: NORMAL

## 2020-08-18 ENCOUNTER — LABORATORY RESULT (OUTPATIENT)
Age: 50
End: 2020-08-18

## 2020-08-20 ENCOUNTER — TRANSCRIPTION ENCOUNTER (OUTPATIENT)
Age: 50
End: 2020-08-20

## 2020-08-20 DIAGNOSIS — Z47.1 AFTERCARE FOLLOWING JOINT REPLACEMENT SURGERY: ICD-10-CM

## 2020-08-20 DIAGNOSIS — Z96.641 AFTERCARE FOLLOWING JOINT REPLACEMENT SURGERY: ICD-10-CM

## 2020-08-20 RX ORDER — ACETAMINOPHEN 500 MG/1
500 TABLET ORAL
Qty: 180 | Refills: 1 | Status: ACTIVE | COMMUNITY
Start: 2020-08-20 | End: 1900-01-01

## 2020-08-20 RX ORDER — OXYCODONE 5 MG/1
5 TABLET ORAL
Qty: 50 | Refills: 0 | Status: ACTIVE | COMMUNITY
Start: 2020-08-20 | End: 1900-01-01

## 2020-08-20 RX ORDER — PANTOPRAZOLE 40 MG/1
40 TABLET, DELAYED RELEASE ORAL DAILY
Qty: 30 | Refills: 2 | Status: ACTIVE | COMMUNITY
Start: 2020-08-20 | End: 1900-01-01

## 2020-08-20 RX ORDER — CELECOXIB 200 MG/1
200 CAPSULE ORAL TWICE DAILY
Qty: 60 | Refills: 2 | Status: ACTIVE | COMMUNITY
Start: 2020-08-20 | End: 1900-01-01

## 2020-08-20 RX ORDER — ASPIRIN 81 MG/1
81 TABLET ORAL
Qty: 60 | Refills: 0 | Status: ACTIVE | COMMUNITY
Start: 2020-08-20 | End: 1900-01-01

## 2020-08-21 ENCOUNTER — APPOINTMENT (OUTPATIENT)
Dept: ORTHOPEDIC SURGERY | Facility: HOSPITAL | Age: 50
End: 2020-08-21

## 2020-08-21 ENCOUNTER — INPATIENT (INPATIENT)
Facility: HOSPITAL | Age: 50
LOS: 2 days | Discharge: HOME HEALTH | DRG: 470 | End: 2020-08-24
Attending: ORTHOPAEDIC SURGERY | Admitting: ORTHOPAEDIC SURGERY
Payer: COMMERCIAL

## 2020-08-21 VITALS
TEMPERATURE: 98 F | OXYGEN SATURATION: 97 % | RESPIRATION RATE: 18 BRPM | HEIGHT: 69 IN | WEIGHT: 223.99 LBS | HEART RATE: 84 BPM | SYSTOLIC BLOOD PRESSURE: 145 MMHG | DIASTOLIC BLOOD PRESSURE: 75 MMHG

## 2020-08-21 DIAGNOSIS — M19.90 UNSPECIFIED OSTEOARTHRITIS, UNSPECIFIED SITE: ICD-10-CM

## 2020-08-21 DIAGNOSIS — Z41.9 ENCOUNTER FOR PROCEDURE FOR PURPOSES OTHER THAN REMEDYING HEALTH STATE, UNSPECIFIED: Chronic | ICD-10-CM

## 2020-08-21 PROCEDURE — 72170 X-RAY EXAM OF PELVIS: CPT | Mod: 26

## 2020-08-21 PROCEDURE — 27130 TOTAL HIP ARTHROPLASTY: CPT | Mod: RT

## 2020-08-21 RX ORDER — ONDANSETRON 8 MG/1
4 TABLET, FILM COATED ORAL EVERY 6 HOURS
Refills: 0 | Status: DISCONTINUED | OUTPATIENT
Start: 2020-08-21 | End: 2020-08-24

## 2020-08-21 RX ORDER — SENNA PLUS 8.6 MG/1
2 TABLET ORAL AT BEDTIME
Refills: 0 | Status: DISCONTINUED | OUTPATIENT
Start: 2020-08-21 | End: 2020-08-24

## 2020-08-21 RX ORDER — BUPIVACAINE 13.3 MG/ML
20 INJECTION, SUSPENSION, LIPOSOMAL INFILTRATION ONCE
Refills: 0 | Status: DISCONTINUED | OUTPATIENT
Start: 2020-08-21 | End: 2020-08-24

## 2020-08-21 RX ORDER — ACETAMINOPHEN 500 MG
650 TABLET ORAL EVERY 6 HOURS
Refills: 0 | Status: DISCONTINUED | OUTPATIENT
Start: 2020-08-21 | End: 2020-08-24

## 2020-08-21 RX ORDER — POVIDONE-IODINE 5 %
1 AEROSOL (ML) TOPICAL ONCE
Refills: 0 | Status: COMPLETED | OUTPATIENT
Start: 2020-08-21 | End: 2020-08-21

## 2020-08-21 RX ORDER — SODIUM CHLORIDE 9 MG/ML
1000 INJECTION, SOLUTION INTRAVENOUS
Refills: 0 | Status: DISCONTINUED | OUTPATIENT
Start: 2020-08-21 | End: 2020-08-24

## 2020-08-21 RX ORDER — CHLORHEXIDINE GLUCONATE 213 G/1000ML
1 SOLUTION TOPICAL ONCE
Refills: 0 | Status: COMPLETED | OUTPATIENT
Start: 2020-08-21 | End: 2020-08-21

## 2020-08-21 RX ORDER — GABAPENTIN 400 MG/1
0 CAPSULE ORAL
Qty: 0 | Refills: 0 | DISCHARGE

## 2020-08-21 RX ORDER — CEFAZOLIN SODIUM 1 G
2000 VIAL (EA) INJECTION EVERY 8 HOURS
Refills: 0 | Status: COMPLETED | OUTPATIENT
Start: 2020-08-21 | End: 2020-08-22

## 2020-08-21 RX ORDER — OXYCODONE HYDROCHLORIDE 5 MG/1
5 TABLET ORAL EVERY 4 HOURS
Refills: 0 | Status: DISCONTINUED | OUTPATIENT
Start: 2020-08-21 | End: 2020-08-24

## 2020-08-21 RX ORDER — SCOPALAMINE 1 MG/3D
1 PATCH, EXTENDED RELEASE TRANSDERMAL ONCE
Refills: 0 | Status: COMPLETED | OUTPATIENT
Start: 2020-08-21 | End: 2020-08-21

## 2020-08-21 RX ORDER — HYDROMORPHONE HYDROCHLORIDE 2 MG/ML
0.5 INJECTION INTRAMUSCULAR; INTRAVENOUS; SUBCUTANEOUS EVERY 4 HOURS
Refills: 0 | Status: DISCONTINUED | OUTPATIENT
Start: 2020-08-21 | End: 2020-08-24

## 2020-08-21 RX ORDER — ASPIRIN/CALCIUM CARB/MAGNESIUM 324 MG
81 TABLET ORAL
Refills: 0 | Status: DISCONTINUED | OUTPATIENT
Start: 2020-08-22 | End: 2020-08-24

## 2020-08-21 RX ORDER — CELECOXIB 200 MG/1
400 CAPSULE ORAL ONCE
Refills: 0 | Status: COMPLETED | OUTPATIENT
Start: 2020-08-21 | End: 2020-08-21

## 2020-08-21 RX ORDER — CELECOXIB 200 MG/1
200 CAPSULE ORAL
Refills: 0 | Status: DISCONTINUED | OUTPATIENT
Start: 2020-08-23 | End: 2020-08-24

## 2020-08-21 RX ORDER — OXYCODONE HYDROCHLORIDE 5 MG/1
10 TABLET ORAL EVERY 4 HOURS
Refills: 0 | Status: DISCONTINUED | OUTPATIENT
Start: 2020-08-21 | End: 2020-08-24

## 2020-08-21 RX ORDER — FAMOTIDINE 10 MG/ML
20 INJECTION INTRAVENOUS EVERY 12 HOURS
Refills: 0 | Status: DISCONTINUED | OUTPATIENT
Start: 2020-08-21 | End: 2020-08-24

## 2020-08-21 RX ORDER — POLYETHYLENE GLYCOL 3350 17 G/17G
17 POWDER, FOR SOLUTION ORAL DAILY
Refills: 0 | Status: DISCONTINUED | OUTPATIENT
Start: 2020-08-21 | End: 2020-08-24

## 2020-08-21 RX ORDER — METOCLOPRAMIDE HCL 10 MG
10 TABLET ORAL EVERY 4 HOURS
Refills: 0 | Status: DISCONTINUED | OUTPATIENT
Start: 2020-08-21 | End: 2020-08-24

## 2020-08-21 RX ORDER — ACETAMINOPHEN 500 MG
1000 TABLET ORAL ONCE
Refills: 0 | Status: COMPLETED | OUTPATIENT
Start: 2020-08-21 | End: 2020-08-21

## 2020-08-21 RX ORDER — KETOROLAC TROMETHAMINE 30 MG/ML
15 SYRINGE (ML) INJECTION EVERY 6 HOURS
Refills: 0 | Status: DISCONTINUED | OUTPATIENT
Start: 2020-08-21 | End: 2020-08-23

## 2020-08-21 RX ORDER — HYDROMORPHONE HYDROCHLORIDE 2 MG/ML
0.5 INJECTION INTRAMUSCULAR; INTRAVENOUS; SUBCUTANEOUS
Refills: 0 | Status: DISCONTINUED | OUTPATIENT
Start: 2020-08-21 | End: 2020-08-21

## 2020-08-21 RX ORDER — GABAPENTIN 400 MG/1
600 CAPSULE ORAL ONCE
Refills: 0 | Status: COMPLETED | OUTPATIENT
Start: 2020-08-21 | End: 2020-08-21

## 2020-08-21 RX ORDER — GABAPENTIN 400 MG/1
300 CAPSULE ORAL THREE TIMES A DAY
Refills: 0 | Status: DISCONTINUED | OUTPATIENT
Start: 2020-08-21 | End: 2020-08-24

## 2020-08-21 RX ADMIN — HYDROMORPHONE HYDROCHLORIDE 0.5 MILLIGRAM(S): 2 INJECTION INTRAMUSCULAR; INTRAVENOUS; SUBCUTANEOUS at 19:13

## 2020-08-21 RX ADMIN — CHLORHEXIDINE GLUCONATE 1 APPLICATION(S): 213 SOLUTION TOPICAL at 09:18

## 2020-08-21 RX ADMIN — OXYCODONE HYDROCHLORIDE 10 MILLIGRAM(S): 5 TABLET ORAL at 20:00

## 2020-08-21 RX ADMIN — Medication 15 MILLIGRAM(S): at 23:24

## 2020-08-21 RX ADMIN — OXYCODONE HYDROCHLORIDE 10 MILLIGRAM(S): 5 TABLET ORAL at 20:45

## 2020-08-21 RX ADMIN — CELECOXIB 400 MILLIGRAM(S): 200 CAPSULE ORAL at 11:19

## 2020-08-21 RX ADMIN — SODIUM CHLORIDE 100 MILLILITER(S): 9 INJECTION, SOLUTION INTRAVENOUS at 17:55

## 2020-08-21 RX ADMIN — HYDROMORPHONE HYDROCHLORIDE 0.5 MILLIGRAM(S): 2 INJECTION INTRAMUSCULAR; INTRAVENOUS; SUBCUTANEOUS at 18:11

## 2020-08-21 RX ADMIN — HYDROMORPHONE HYDROCHLORIDE 0.5 MILLIGRAM(S): 2 INJECTION INTRAMUSCULAR; INTRAVENOUS; SUBCUTANEOUS at 17:26

## 2020-08-21 RX ADMIN — Medication 1000 MILLIGRAM(S): at 11:19

## 2020-08-21 RX ADMIN — Medication 15 MILLIGRAM(S): at 23:39

## 2020-08-21 RX ADMIN — GABAPENTIN 300 MILLIGRAM(S): 400 CAPSULE ORAL at 23:24

## 2020-08-21 RX ADMIN — GABAPENTIN 600 MILLIGRAM(S): 400 CAPSULE ORAL at 11:17

## 2020-08-21 RX ADMIN — Medication 100 MILLIGRAM(S): at 19:03

## 2020-08-21 RX ADMIN — Medication 1 APPLICATION(S): at 09:19

## 2020-08-21 RX ADMIN — Medication 1000 MILLIGRAM(S): at 11:17

## 2020-08-21 RX ADMIN — HYDROMORPHONE HYDROCHLORIDE 0.5 MILLIGRAM(S): 2 INJECTION INTRAMUSCULAR; INTRAVENOUS; SUBCUTANEOUS at 16:34

## 2020-08-21 RX ADMIN — SCOPALAMINE 1 PATCH: 1 PATCH, EXTENDED RELEASE TRANSDERMAL at 22:00

## 2020-08-21 RX ADMIN — ONDANSETRON 4 MILLIGRAM(S): 8 TABLET, FILM COATED ORAL at 20:15

## 2020-08-21 RX ADMIN — Medication 15 MILLIGRAM(S): at 18:17

## 2020-08-21 RX ADMIN — CELECOXIB 400 MILLIGRAM(S): 200 CAPSULE ORAL at 11:17

## 2020-08-21 RX ADMIN — HYDROMORPHONE HYDROCHLORIDE 0.5 MILLIGRAM(S): 2 INJECTION INTRAMUSCULAR; INTRAVENOUS; SUBCUTANEOUS at 18:34

## 2020-08-21 RX ADMIN — FAMOTIDINE 20 MILLIGRAM(S): 10 INJECTION INTRAVENOUS at 18:11

## 2020-08-21 RX ADMIN — HYDROMORPHONE HYDROCHLORIDE 0.5 MILLIGRAM(S): 2 INJECTION INTRAMUSCULAR; INTRAVENOUS; SUBCUTANEOUS at 17:15

## 2020-08-21 RX ADMIN — Medication 15 MILLIGRAM(S): at 18:11

## 2020-08-21 RX ADMIN — Medication 10 MILLIGRAM(S): at 22:00

## 2020-08-21 NOTE — H&P ADULT - HISTORY OF PRESENT ILLNESS
51yo m c/o right hip pain x   Presents today for elective RIGHT THR. 51yo m c/o right hip pain since May 2018. Pt. states his ceiling collapsed and ran into the debride on the floor. Pt. eventually got an MRI and told he needed a THR. Pt. c/o right hip pain with radiation to low back. Pt. has numbness/tingling in b/l les. Pt. has been using a cane since last August and now a rolling walker since March 2020, due to him having a 2 floor walk up. Pt. states he is having more difficulty with walking, unable to carry garbage downstairs   Presents today for elective RIGHT THR. 49yo m c/o right hip pain since May 2018. Pt. states his ceiling collapsed and ran into the debride on the floor. Pt. eventually got an MRI and told he needed a THR. Pt. c/o right hip pain with radiation to low back. Pt. has numbness/tingling in b/l les. Pt. has been using a cane since last August and now a rolling walker since March 2020, due to him having a 2 floor walk up. Pt. states he is having more difficulty with walking, unable to carry garbage downstairs. Pt. had on hip injection which worked for 72h and insurance would not pay for more injections.   Presents today for elective RIGHT THR.

## 2020-08-21 NOTE — PHYSICAL THERAPY INITIAL EVALUATION ADULT - ADDITIONAL COMMENTS
Pt. has been ambulating with a Rollator and has a SC. Pt. has been living with his Sister for the past month and will be going back. The apartment is an elevator access apartment and has a ramp to enter the building.

## 2020-08-21 NOTE — H&P ADULT - PROBLEM SELECTOR PLAN 1
Admit to Orthopaedic Service.  Presents today for elective RIGHT THR.  Pt medically stable and cleared for procedure today by  Admit to Orthopaedic Service.  Presents today for elective RIGHT THR.  Pt medically stable and cleared for procedure today by Dr. Blackwell.

## 2020-08-21 NOTE — PHYSICAL THERAPY INITIAL EVALUATION ADULT - MODALITIES TREATMENT COMMENTS
Supine Therex: Ankle pump, Heel slides, Glute squeezes, Quad sets. Supine Therex: Ankle pump, Heel slides, Glute sets , Quad sets.

## 2020-08-21 NOTE — PROGRESS NOTE ADULT - SUBJECTIVE AND OBJECTIVE BOX
Ortho Post Op Check    Procedure: R BERNARD  Surgeon: Oh    Pt comfortable without complaints, pain controlled  Denies CP, SOB, N/V, numbness/tingling     Vital Signs Last 24 Hrs  T(C): 36.4 (08-21-20 @ 20:53), Max: 36.4 (08-21-20 @ 20:53)  T(F): 97.5 (08-21-20 @ 20:53), Max: 97.5 (08-21-20 @ 20:53)  HR: 72 (08-21-20 @ 20:53) (68 - 86)  BP: 161/116 (08-21-20 @ 20:53) (126/75 - 161/116)  BP(mean): 107 (08-21-20 @ 19:07) (106 - 115)  RR: 17 (08-21-20 @ 20:53) (12 - 20)  SpO2: 94% (08-21-20 @ 20:53) (94% - 100%)  AVSS    General: Pt Alert and oriented, NAD  R hip [place] DSG C/D/I  Sensation intact s/s/sp/dp/t and symmetric   Motor: EHL/FHL/TA/GS 5/5 and symmetric   2+ DP pulse  Toes WWP        Post-op X-Ray: Hardware in place with good alignment     A/P: 50yMale POD#0 s/p R BERNARD 8/21  - Stable  - Pain Control  - DVT ppx: ASA 81mg  - Post op abx: ancef periop  - PT, WBS: WBAT  - dispo pending PT eval     Ortho Pager [phone]

## 2020-08-21 NOTE — PHYSICAL THERAPY INITIAL EVALUATION ADULT - PERTINENT HX OF CURRENT PROBLEM, REHAB EVAL
49yo m c/o right hip pain since May 2018. Pt. states his ceiling collapsed and ran into the debride on the floor. Pt. eventually got an MRI and told he needed a THR. Pt. c/o right hip pain with radiation to low back. Pt. has numbness/tingling in b/l les. Pt. has been using a cane since last August and now a rolling walker since March 2020, due to him having a 2 floor walk up. Pt. states he is having more difficulty with walking, unable to carry garbage downstairs.

## 2020-08-21 NOTE — PHYSICAL THERAPY INITIAL EVALUATION ADULT - IMPAIRED TRANSFERS: SIT/STAND, REHAB EVAL
decreased flexibility/pain/decreased strength pain/decreased flexibility/decreased strength/impaired balance

## 2020-08-21 NOTE — PHYSICAL THERAPY INITIAL EVALUATION ADULT - MANUAL MUSCLE TESTING RESULTS, REHAB EVAL
Functional mobility assessed in anti gravity B/L UE& LE therefore at least 3/5/grossly assessed due to

## 2020-08-21 NOTE — H&P ADULT - NSHPPHYSICALEXAM_GEN_ALL_CORE
GENERAL:  PE:  SLT INTACT, DP/PT 2+, EHL/TA/GS  Decreased ROM secondary to pain. Rest of PE per medical clearance. GENERAL: NAD  PE: Right le old scars from when patient was younger.   SLT INTACT, DP/PT 2+, EHL/TA/GS 5/5.  Decreased ROM secondary to pain. Rest of PE per medical clearance.

## 2020-08-21 NOTE — PHYSICAL THERAPY INITIAL EVALUATION ADULT - CRITERIA FOR SKILLED THERAPEUTIC INTERVENTIONS
anticipated equipment needs at discharge/impairments found/therapy frequency/predicted duration of therapy intervention/functional limitations in following categories/anticipated discharge recommendation

## 2020-08-22 ENCOUNTER — TRANSCRIPTION ENCOUNTER (OUTPATIENT)
Age: 50
End: 2020-08-22

## 2020-08-22 LAB
ANION GAP SERPL CALC-SCNC: 9 MMOL/L — SIGNIFICANT CHANGE UP (ref 5–17)
BUN SERPL-MCNC: 13 MG/DL — SIGNIFICANT CHANGE UP (ref 7–23)
CALCIUM SERPL-MCNC: 8.5 MG/DL — SIGNIFICANT CHANGE UP (ref 8.4–10.5)
CHLORIDE SERPL-SCNC: 103 MMOL/L — SIGNIFICANT CHANGE UP (ref 96–108)
CO2 SERPL-SCNC: 27 MMOL/L — SIGNIFICANT CHANGE UP (ref 22–31)
CREAT SERPL-MCNC: 0.91 MG/DL — SIGNIFICANT CHANGE UP (ref 0.5–1.3)
GLUCOSE SERPL-MCNC: 111 MG/DL — HIGH (ref 70–99)
HCT VFR BLD CALC: 33.1 % — LOW (ref 39–50)
HGB BLD-MCNC: 10.5 G/DL — LOW (ref 13–17)
MCHC RBC-ENTMCNC: 31.3 PG — SIGNIFICANT CHANGE UP (ref 27–34)
MCHC RBC-ENTMCNC: 31.7 GM/DL — LOW (ref 32–36)
MCV RBC AUTO: 98.8 FL — SIGNIFICANT CHANGE UP (ref 80–100)
NRBC # BLD: 0 /100 WBCS — SIGNIFICANT CHANGE UP (ref 0–0)
PLATELET # BLD AUTO: 369 K/UL — SIGNIFICANT CHANGE UP (ref 150–400)
POTASSIUM SERPL-MCNC: 3.7 MMOL/L — SIGNIFICANT CHANGE UP (ref 3.5–5.3)
POTASSIUM SERPL-SCNC: 3.7 MMOL/L — SIGNIFICANT CHANGE UP (ref 3.5–5.3)
RBC # BLD: 3.35 M/UL — LOW (ref 4.2–5.8)
RBC # FLD: 12.3 % — SIGNIFICANT CHANGE UP (ref 10.3–14.5)
SODIUM SERPL-SCNC: 139 MMOL/L — SIGNIFICANT CHANGE UP (ref 135–145)
WBC # BLD: 8.77 K/UL — SIGNIFICANT CHANGE UP (ref 3.8–10.5)
WBC # FLD AUTO: 8.77 K/UL — SIGNIFICANT CHANGE UP (ref 3.8–10.5)

## 2020-08-22 RX ORDER — ASPIRIN/CALCIUM CARB/MAGNESIUM 324 MG
1 TABLET ORAL
Qty: 60 | Refills: 1
Start: 2020-08-22 | End: 2020-10-20

## 2020-08-22 RX ORDER — OXYCODONE HYDROCHLORIDE 5 MG/1
1 TABLET ORAL
Qty: 30 | Refills: 0
Start: 2020-08-22 | End: 2020-08-26

## 2020-08-22 RX ADMIN — FAMOTIDINE 20 MILLIGRAM(S): 10 INJECTION INTRAVENOUS at 05:50

## 2020-08-22 RX ADMIN — Medication 81 MILLIGRAM(S): at 17:32

## 2020-08-22 RX ADMIN — GABAPENTIN 300 MILLIGRAM(S): 400 CAPSULE ORAL at 21:22

## 2020-08-22 RX ADMIN — HYDROMORPHONE HYDROCHLORIDE 0.5 MILLIGRAM(S): 2 INJECTION INTRAMUSCULAR; INTRAVENOUS; SUBCUTANEOUS at 04:41

## 2020-08-22 RX ADMIN — Medication 81 MILLIGRAM(S): at 05:50

## 2020-08-22 RX ADMIN — OXYCODONE HYDROCHLORIDE 10 MILLIGRAM(S): 5 TABLET ORAL at 17:37

## 2020-08-22 RX ADMIN — OXYCODONE HYDROCHLORIDE 10 MILLIGRAM(S): 5 TABLET ORAL at 12:14

## 2020-08-22 RX ADMIN — Medication 100 MILLIGRAM(S): at 04:38

## 2020-08-22 RX ADMIN — OXYCODONE HYDROCHLORIDE 10 MILLIGRAM(S): 5 TABLET ORAL at 11:14

## 2020-08-22 RX ADMIN — Medication 15 MILLIGRAM(S): at 23:59

## 2020-08-22 RX ADMIN — GABAPENTIN 300 MILLIGRAM(S): 400 CAPSULE ORAL at 05:50

## 2020-08-22 RX ADMIN — SCOPALAMINE 1 PATCH: 1 PATCH, EXTENDED RELEASE TRANSDERMAL at 17:39

## 2020-08-22 RX ADMIN — POLYETHYLENE GLYCOL 3350 17 GRAM(S): 17 POWDER, FOR SOLUTION ORAL at 10:52

## 2020-08-22 RX ADMIN — HYDROMORPHONE HYDROCHLORIDE 0.5 MILLIGRAM(S): 2 INJECTION INTRAMUSCULAR; INTRAVENOUS; SUBCUTANEOUS at 00:33

## 2020-08-22 RX ADMIN — Medication 15 MILLIGRAM(S): at 23:44

## 2020-08-22 RX ADMIN — OXYCODONE HYDROCHLORIDE 10 MILLIGRAM(S): 5 TABLET ORAL at 21:22

## 2020-08-22 RX ADMIN — Medication 15 MILLIGRAM(S): at 10:53

## 2020-08-22 RX ADMIN — HYDROMORPHONE HYDROCHLORIDE 0.5 MILLIGRAM(S): 2 INJECTION INTRAMUSCULAR; INTRAVENOUS; SUBCUTANEOUS at 00:48

## 2020-08-22 RX ADMIN — OXYCODONE HYDROCHLORIDE 10 MILLIGRAM(S): 5 TABLET ORAL at 16:35

## 2020-08-22 RX ADMIN — Medication 15 MILLIGRAM(S): at 17:32

## 2020-08-22 RX ADMIN — HYDROMORPHONE HYDROCHLORIDE 0.5 MILLIGRAM(S): 2 INJECTION INTRAMUSCULAR; INTRAVENOUS; SUBCUTANEOUS at 04:56

## 2020-08-22 RX ADMIN — FAMOTIDINE 20 MILLIGRAM(S): 10 INJECTION INTRAVENOUS at 17:32

## 2020-08-22 RX ADMIN — Medication 15 MILLIGRAM(S): at 05:50

## 2020-08-22 RX ADMIN — Medication 15 MILLIGRAM(S): at 06:05

## 2020-08-22 RX ADMIN — Medication 15 MILLIGRAM(S): at 17:31

## 2020-08-22 RX ADMIN — SCOPALAMINE 1 PATCH: 1 PATCH, EXTENDED RELEASE TRANSDERMAL at 06:17

## 2020-08-22 RX ADMIN — OXYCODONE HYDROCHLORIDE 10 MILLIGRAM(S): 5 TABLET ORAL at 21:52

## 2020-08-22 RX ADMIN — GABAPENTIN 300 MILLIGRAM(S): 400 CAPSULE ORAL at 14:14

## 2020-08-22 NOTE — DISCHARGE NOTE PROVIDER - NSDCMRMEDTOKEN_GEN_ALL_CORE_FT
Aspirin Enteric Coated 81 mg oral delayed release tablet: 1 tab(s) orally 2 times a day MDD:2  gabapentin 300 mg oral tablet: orally 3 times a day  ibuprofen 800 mg oral tablet: 1 tab(s) orally 3 times a day  oxyCODONE 10 mg oral tablet, extended release: orally 3 times a day  oxyCODONE 5 mg oral capsule: 1 cap(s) orally every 4 hours MDD:6 gabapentin 300 mg oral tablet: orally 3 times a day  ibuprofen 800 mg oral tablet: 1 tab(s) orally 3 times a day  oxyCODONE 10 mg oral tablet, extended release: orally 3 times a day acetaminophen 500 mg oral tablet: 2 tab(s) orally 3 times a day   Aspirin Enteric Coated 81 mg oral delayed release tablet: 1 tab(s) orally 2 times a day   gabapentin 300 mg oral tablet: orally 3 times a day  meloxicam 15 mg oral tablet: 1 tab(s) orally once a day   oxyCODONE 5 mg oral tablet: 1-2 tab(s) orally every 4 hours, As Needed for severe pain MDD:12  pantoprazole 40 mg oral delayed release tablet: 1 tab(s) orally once a day   traMADol 50 mg oral tablet: 1 tab(s) orally every 8 hours, As Needed for moderate to severe pain MDD:3

## 2020-08-22 NOTE — DISCHARGE NOTE PROVIDER - NSDCFUADDINST_GEN_ALL_CORE_FT
You may bear full weight as tolerated using a walker, cane, crutches as needed. Apply ice packs to the surgical site and elevate the leg above the level of the heart several times a day. Place a pillow behind your knee for comfort.     If desired, wear compression stockings during the first 5-10 days after surgery in order to reduce swelling. Take medications as prescribed. Keep dressing clean and dry. You can remove the dressing 5 days after surgery by yourself.     If you observe drainage call the office. If the incision is dry and there is no drainage you may shower. Do not soak or scrub the incision.     Follow-up in the office 2-3 weeks after surgery.Call to schedule an appointment. If you experience fever>101F, chills, pain (increasingly severe), redness of wound, or unusual drainage please call the office. If you experience chest pain or difficulty breathing, call 911. You may bear full weight as tolerated using a walker, cane, crutches as needed. Apply ice packs to the surgical site and elevate the leg above the level of the heart several times a day. Place a pillow behind your knee for comfort.     If desired, wear compression stockings during the first 5-10 days after surgery in order to reduce swelling. Take medications as prescribed. Keep dressing clean and dry. You can remove the dressing 5 days after surgery by yourself.     If you observe drainage call the office. If the incision is dry and there is no drainage you may shower. Do not soak or scrub the incision.     Follow-up in the office 2-3 weeks after surgery. Call to schedule an appointment if you don't already have one. If you experience fever>101F, chills, pain (increasingly severe), redness of wound, or unusual drainage please call the office. If you experience chest pain or difficulty breathing, call 911. **See Dr. Longoria's discharge instructions**    Your prescriptions were sent to your [facility] [employer] at [address] in the [place].     You should call your Pain Management doctor and make an appointment with them for as soon as possible once you are discharged from the hospital (2-5 days). You will not receive any further pain medication prescriptions from Dr. Lognoria since you have a contract with a pain management provider.

## 2020-08-22 NOTE — PROGRESS NOTE ADULT - SUBJECTIVE AND OBJECTIVE BOX
Ortho Note    Pt comfortable without complaints, pain controlled  Denies CP, SOB, N/V, numbness/tingling     Vital Signs Last 24 Hrs  T(C): 36.5 (08-22-20 @ 09:33), Max: 36.5 (08-22-20 @ 09:33)  T(F): 97.7 (08-22-20 @ 09:33), Max: 97.7 (08-22-20 @ 09:33)  HR: 92 (08-22-20 @ 09:33) (92 - 92)  BP: 127/84 (08-22-20 @ 09:33) (127/84 - 127/84)  BP(mean): --  RR: 18 (08-22-20 @ 09:33) (18 - 18)  SpO2: 94% (08-22-20 @ 09:33) (94% - 94%)  AVSS    General: Pt Alert and oriented, NAD  DSG C/D/I  Pulses: 2+ DP pulse  Sensation: SILT over distal limb and symmetric to contralateral side  Motor: 5/5 EHL/FHL/TA/GS                          10.5   8.77  )-----------( 369      ( 22 Aug 2020 06:54 )             33.1   22 Aug 2020 06:54    139    |  103    |  13     ----------------------------<  111    3.7     |  27     |  0.91     Ca    8.5        22 Aug 2020 06:54        A/P: 50yMale s/p R BERNARD  - Stable  - Pain Control  - DVT ppx: asa  - PT, WBS: WBAt    Ortho Pager [phone]

## 2020-08-22 NOTE — DISCHARGE NOTE PROVIDER - CARE PROVIDER_API CALL
Jeremías Longoria)  Orthopedics  [address] [facility] [place] [address]  Phone: [phone]  Fax: [phone]  Follow Up Time: Jeremías Longoria)  Orthopedics  [address], [address] [facility] [place] [address]  Phone: [phone]  Fax: [phone]  Follow Up Time: 2 weeks

## 2020-08-22 NOTE — DISCHARGE NOTE PROVIDER - HOSPITAL COURSE
Admitted    Surgery    Crystal-op Antibiotics    Pain control    DVT prophylaxis    OOB/Physical Therapy

## 2020-08-23 LAB
ANION GAP SERPL CALC-SCNC: 10 MMOL/L — SIGNIFICANT CHANGE UP (ref 5–17)
BUN SERPL-MCNC: 15 MG/DL — SIGNIFICANT CHANGE UP (ref 7–23)
CALCIUM SERPL-MCNC: 8.8 MG/DL — SIGNIFICANT CHANGE UP (ref 8.4–10.5)
CHLORIDE SERPL-SCNC: 104 MMOL/L — SIGNIFICANT CHANGE UP (ref 96–108)
CO2 SERPL-SCNC: 25 MMOL/L — SIGNIFICANT CHANGE UP (ref 22–31)
CREAT SERPL-MCNC: 0.94 MG/DL — SIGNIFICANT CHANGE UP (ref 0.5–1.3)
GLUCOSE SERPL-MCNC: 145 MG/DL — HIGH (ref 70–99)
HCT VFR BLD CALC: 31.7 % — LOW (ref 39–50)
HGB BLD-MCNC: 10.3 G/DL — LOW (ref 13–17)
MCHC RBC-ENTMCNC: 32.1 PG — SIGNIFICANT CHANGE UP (ref 27–34)
MCHC RBC-ENTMCNC: 32.5 GM/DL — SIGNIFICANT CHANGE UP (ref 32–36)
MCV RBC AUTO: 98.8 FL — SIGNIFICANT CHANGE UP (ref 80–100)
NRBC # BLD: 0 /100 WBCS — SIGNIFICANT CHANGE UP (ref 0–0)
PLATELET # BLD AUTO: 339 K/UL — SIGNIFICANT CHANGE UP (ref 150–400)
POTASSIUM SERPL-MCNC: 3.9 MMOL/L — SIGNIFICANT CHANGE UP (ref 3.5–5.3)
POTASSIUM SERPL-SCNC: 3.9 MMOL/L — SIGNIFICANT CHANGE UP (ref 3.5–5.3)
RBC # BLD: 3.21 M/UL — LOW (ref 4.2–5.8)
RBC # FLD: 12.5 % — SIGNIFICANT CHANGE UP (ref 10.3–14.5)
SODIUM SERPL-SCNC: 139 MMOL/L — SIGNIFICANT CHANGE UP (ref 135–145)
WBC # BLD: 8.53 K/UL — SIGNIFICANT CHANGE UP (ref 3.8–10.5)
WBC # FLD AUTO: 8.53 K/UL — SIGNIFICANT CHANGE UP (ref 3.8–10.5)

## 2020-08-23 RX ORDER — DIPHENHYDRAMINE HCL 50 MG
25 CAPSULE ORAL EVERY 4 HOURS
Refills: 0 | Status: DISCONTINUED | OUTPATIENT
Start: 2020-08-23 | End: 2020-08-24

## 2020-08-23 RX ADMIN — Medication 15 MILLIGRAM(S): at 13:30

## 2020-08-23 RX ADMIN — Medication 15 MILLIGRAM(S): at 12:44

## 2020-08-23 RX ADMIN — Medication 15 MILLIGRAM(S): at 05:50

## 2020-08-23 RX ADMIN — OXYCODONE HYDROCHLORIDE 10 MILLIGRAM(S): 5 TABLET ORAL at 06:02

## 2020-08-23 RX ADMIN — HYDROMORPHONE HYDROCHLORIDE 0.5 MILLIGRAM(S): 2 INJECTION INTRAMUSCULAR; INTRAVENOUS; SUBCUTANEOUS at 17:16

## 2020-08-23 RX ADMIN — OXYCODONE HYDROCHLORIDE 10 MILLIGRAM(S): 5 TABLET ORAL at 19:00

## 2020-08-23 RX ADMIN — OXYCODONE HYDROCHLORIDE 10 MILLIGRAM(S): 5 TABLET ORAL at 18:30

## 2020-08-23 RX ADMIN — HYDROMORPHONE HYDROCHLORIDE 0.5 MILLIGRAM(S): 2 INJECTION INTRAMUSCULAR; INTRAVENOUS; SUBCUTANEOUS at 17:30

## 2020-08-23 RX ADMIN — Medication 81 MILLIGRAM(S): at 05:32

## 2020-08-23 RX ADMIN — GABAPENTIN 300 MILLIGRAM(S): 400 CAPSULE ORAL at 12:43

## 2020-08-23 RX ADMIN — OXYCODONE HYDROCHLORIDE 10 MILLIGRAM(S): 5 TABLET ORAL at 23:34

## 2020-08-23 RX ADMIN — SCOPALAMINE 1 PATCH: 1 PATCH, EXTENDED RELEASE TRANSDERMAL at 06:39

## 2020-08-23 RX ADMIN — CELECOXIB 200 MILLIGRAM(S): 200 CAPSULE ORAL at 17:40

## 2020-08-23 RX ADMIN — Medication 650 MILLIGRAM(S): at 19:10

## 2020-08-23 RX ADMIN — GABAPENTIN 300 MILLIGRAM(S): 400 CAPSULE ORAL at 05:32

## 2020-08-23 RX ADMIN — Medication 650 MILLIGRAM(S): at 18:30

## 2020-08-23 RX ADMIN — OXYCODONE HYDROCHLORIDE 10 MILLIGRAM(S): 5 TABLET ORAL at 10:30

## 2020-08-23 RX ADMIN — OXYCODONE HYDROCHLORIDE 10 MILLIGRAM(S): 5 TABLET ORAL at 22:34

## 2020-08-23 RX ADMIN — OXYCODONE HYDROCHLORIDE 10 MILLIGRAM(S): 5 TABLET ORAL at 10:00

## 2020-08-23 RX ADMIN — GABAPENTIN 300 MILLIGRAM(S): 400 CAPSULE ORAL at 22:34

## 2020-08-23 RX ADMIN — Medication 81 MILLIGRAM(S): at 17:18

## 2020-08-23 RX ADMIN — OXYCODONE HYDROCHLORIDE 10 MILLIGRAM(S): 5 TABLET ORAL at 05:32

## 2020-08-23 RX ADMIN — OXYCODONE HYDROCHLORIDE 10 MILLIGRAM(S): 5 TABLET ORAL at 14:35

## 2020-08-23 RX ADMIN — SCOPALAMINE 1 PATCH: 1 PATCH, EXTENDED RELEASE TRANSDERMAL at 17:38

## 2020-08-23 RX ADMIN — CELECOXIB 200 MILLIGRAM(S): 200 CAPSULE ORAL at 17:18

## 2020-08-23 RX ADMIN — FAMOTIDINE 20 MILLIGRAM(S): 10 INJECTION INTRAVENOUS at 17:18

## 2020-08-23 RX ADMIN — CELECOXIB 200 MILLIGRAM(S): 200 CAPSULE ORAL at 06:02

## 2020-08-23 RX ADMIN — CELECOXIB 200 MILLIGRAM(S): 200 CAPSULE ORAL at 05:32

## 2020-08-23 RX ADMIN — Medication 15 MILLIGRAM(S): at 05:32

## 2020-08-23 RX ADMIN — OXYCODONE HYDROCHLORIDE 10 MILLIGRAM(S): 5 TABLET ORAL at 14:15

## 2020-08-23 RX ADMIN — FAMOTIDINE 20 MILLIGRAM(S): 10 INJECTION INTRAVENOUS at 05:32

## 2020-08-23 RX ADMIN — POLYETHYLENE GLYCOL 3350 17 GRAM(S): 17 POWDER, FOR SOLUTION ORAL at 12:44

## 2020-08-23 NOTE — PROGRESS NOTE ADULT - SUBJECTIVE AND OBJECTIVE BOX
Ortho Note    Pt comfortable without complaints, pain controlled. Pleasant.   Denies CP, SOB, N/V, numbness/tingling     Vital Signs Last 24 Hrs  T(C): 37.5 (23 Aug 2020 04:30), Max: 37.5 (23 Aug 2020 04:30)  T(F): 99.5 (23 Aug 2020 04:30), Max: 99.5 (23 Aug 2020 04:30)  HR: 99 (23 Aug 2020 04:30) (95 - 113)  BP: 126/85 (23 Aug 2020 04:30) (116/75 - 137/88)  BP(mean): --  RR: 17 (23 Aug 2020 04:30) (17 - 18)  SpO2: 96% (23 Aug 2020 04:30) (95% - 98%)    General: Pt Alert and oriented, NAD  DSG C/D/I  wwp  Sensation: SILT over distal limb and symmetric to contralateral side  Motor: 5/5 EHL/FHL/TA/GS             A/P: 50yMale s/p R BERNARD  - Stable  - Pain Control  - DVT ppx: asa  - PT, WBS: WBAt    Ortho Pager [phone]

## 2020-08-24 ENCOUNTER — TRANSCRIPTION ENCOUNTER (OUTPATIENT)
Age: 50
End: 2020-08-24

## 2020-08-24 VITALS
SYSTOLIC BLOOD PRESSURE: 121 MMHG | TEMPERATURE: 98 F | HEART RATE: 82 BPM | RESPIRATION RATE: 16 BRPM | OXYGEN SATURATION: 95 % | DIASTOLIC BLOOD PRESSURE: 80 MMHG

## 2020-08-24 LAB
ANION GAP SERPL CALC-SCNC: 11 MMOL/L — SIGNIFICANT CHANGE UP (ref 5–17)
BUN SERPL-MCNC: 11 MG/DL — SIGNIFICANT CHANGE UP (ref 7–23)
CALCIUM SERPL-MCNC: 9 MG/DL — SIGNIFICANT CHANGE UP (ref 8.4–10.5)
CHLORIDE SERPL-SCNC: 101 MMOL/L — SIGNIFICANT CHANGE UP (ref 96–108)
CO2 SERPL-SCNC: 27 MMOL/L — SIGNIFICANT CHANGE UP (ref 22–31)
CREAT SERPL-MCNC: 0.9 MG/DL — SIGNIFICANT CHANGE UP (ref 0.5–1.3)
GLUCOSE SERPL-MCNC: 93 MG/DL — SIGNIFICANT CHANGE UP (ref 70–99)
HCT VFR BLD CALC: 33 % — LOW (ref 39–50)
HGB BLD-MCNC: 10.3 G/DL — LOW (ref 13–17)
MCHC RBC-ENTMCNC: 31.2 GM/DL — LOW (ref 32–36)
MCHC RBC-ENTMCNC: 31.4 PG — SIGNIFICANT CHANGE UP (ref 27–34)
MCV RBC AUTO: 100.6 FL — HIGH (ref 80–100)
NRBC # BLD: 0 /100 WBCS — SIGNIFICANT CHANGE UP (ref 0–0)
PLATELET # BLD AUTO: 365 K/UL — SIGNIFICANT CHANGE UP (ref 150–400)
POTASSIUM SERPL-MCNC: 4.2 MMOL/L — SIGNIFICANT CHANGE UP (ref 3.5–5.3)
POTASSIUM SERPL-SCNC: 4.2 MMOL/L — SIGNIFICANT CHANGE UP (ref 3.5–5.3)
RBC # BLD: 3.28 M/UL — LOW (ref 4.2–5.8)
RBC # FLD: 12.2 % — SIGNIFICANT CHANGE UP (ref 10.3–14.5)
SODIUM SERPL-SCNC: 139 MMOL/L — SIGNIFICANT CHANGE UP (ref 135–145)
WBC # BLD: 10.32 K/UL — SIGNIFICANT CHANGE UP (ref 3.8–10.5)
WBC # FLD AUTO: 10.32 K/UL — SIGNIFICANT CHANGE UP (ref 3.8–10.5)

## 2020-08-24 RX ORDER — TRAMADOL HYDROCHLORIDE 50 MG/1
1 TABLET ORAL
Qty: 50 | Refills: 0
Start: 2020-08-24 | End: 2020-08-30

## 2020-08-24 RX ORDER — OXYCODONE HYDROCHLORIDE 5 MG/1
0 TABLET ORAL
Qty: 0 | Refills: 0 | DISCHARGE

## 2020-08-24 RX ORDER — ASPIRIN/CALCIUM CARB/MAGNESIUM 324 MG
1 TABLET ORAL
Qty: 56 | Refills: 0
Start: 2020-08-24 | End: 2020-09-20

## 2020-08-24 RX ORDER — MELOXICAM 15 MG/1
1 TABLET ORAL
Qty: 30 | Refills: 0
Start: 2020-08-24 | End: 2020-09-22

## 2020-08-24 RX ORDER — ACETAMINOPHEN 500 MG
2 TABLET ORAL
Qty: 180 | Refills: 0
Start: 2020-08-24 | End: 2020-09-22

## 2020-08-24 RX ORDER — OXYCODONE HYDROCHLORIDE 5 MG/1
15 TABLET ORAL EVERY 4 HOURS
Refills: 0 | Status: DISCONTINUED | OUTPATIENT
Start: 2020-08-24 | End: 2020-08-24

## 2020-08-24 RX ORDER — IBUPROFEN 200 MG
1 TABLET ORAL
Qty: 0 | Refills: 0 | DISCHARGE

## 2020-08-24 RX ORDER — OXYCODONE HYDROCHLORIDE 5 MG/1
10 TABLET ORAL EVERY 4 HOURS
Refills: 0 | Status: DISCONTINUED | OUTPATIENT
Start: 2020-08-24 | End: 2020-08-24

## 2020-08-24 RX ORDER — PANTOPRAZOLE SODIUM 20 MG/1
1 TABLET, DELAYED RELEASE ORAL
Qty: 28 | Refills: 0
Start: 2020-08-24 | End: 2020-09-20

## 2020-08-24 RX ORDER — OXYCODONE HYDROCHLORIDE 5 MG/1
1 TABLET ORAL
Qty: 50 | Refills: 0
Start: 2020-08-24 | End: 2020-08-30

## 2020-08-24 RX ADMIN — POLYETHYLENE GLYCOL 3350 17 GRAM(S): 17 POWDER, FOR SOLUTION ORAL at 12:24

## 2020-08-24 RX ADMIN — OXYCODONE HYDROCHLORIDE 10 MILLIGRAM(S): 5 TABLET ORAL at 17:00

## 2020-08-24 RX ADMIN — FAMOTIDINE 20 MILLIGRAM(S): 10 INJECTION INTRAVENOUS at 18:17

## 2020-08-24 RX ADMIN — Medication 81 MILLIGRAM(S): at 18:17

## 2020-08-24 RX ADMIN — GABAPENTIN 300 MILLIGRAM(S): 400 CAPSULE ORAL at 06:26

## 2020-08-24 RX ADMIN — CELECOXIB 200 MILLIGRAM(S): 200 CAPSULE ORAL at 06:26

## 2020-08-24 RX ADMIN — OXYCODONE HYDROCHLORIDE 10 MILLIGRAM(S): 5 TABLET ORAL at 02:08

## 2020-08-24 RX ADMIN — Medication 25 MILLIGRAM(S): at 00:00

## 2020-08-24 RX ADMIN — GABAPENTIN 300 MILLIGRAM(S): 400 CAPSULE ORAL at 12:23

## 2020-08-24 RX ADMIN — OXYCODONE HYDROCHLORIDE 10 MILLIGRAM(S): 5 TABLET ORAL at 08:00

## 2020-08-24 RX ADMIN — HYDROMORPHONE HYDROCHLORIDE 0.5 MILLIGRAM(S): 2 INJECTION INTRAMUSCULAR; INTRAVENOUS; SUBCUTANEOUS at 05:00

## 2020-08-24 RX ADMIN — HYDROMORPHONE HYDROCHLORIDE 0.5 MILLIGRAM(S): 2 INJECTION INTRAMUSCULAR; INTRAVENOUS; SUBCUTANEOUS at 09:53

## 2020-08-24 RX ADMIN — OXYCODONE HYDROCHLORIDE 10 MILLIGRAM(S): 5 TABLET ORAL at 11:40

## 2020-08-24 RX ADMIN — OXYCODONE HYDROCHLORIDE 10 MILLIGRAM(S): 5 TABLET ORAL at 06:26

## 2020-08-24 RX ADMIN — FAMOTIDINE 20 MILLIGRAM(S): 10 INJECTION INTRAVENOUS at 06:26

## 2020-08-24 RX ADMIN — Medication 81 MILLIGRAM(S): at 06:26

## 2020-08-24 RX ADMIN — Medication 25 MILLIGRAM(S): at 06:27

## 2020-08-24 RX ADMIN — HYDROMORPHONE HYDROCHLORIDE 0.5 MILLIGRAM(S): 2 INJECTION INTRAMUSCULAR; INTRAVENOUS; SUBCUTANEOUS at 10:10

## 2020-08-24 RX ADMIN — CELECOXIB 200 MILLIGRAM(S): 200 CAPSULE ORAL at 12:22

## 2020-08-24 RX ADMIN — OXYCODONE HYDROCHLORIDE 10 MILLIGRAM(S): 5 TABLET ORAL at 16:35

## 2020-08-24 RX ADMIN — OXYCODONE HYDROCHLORIDE 10 MILLIGRAM(S): 5 TABLET ORAL at 11:14

## 2020-08-24 RX ADMIN — CELECOXIB 200 MILLIGRAM(S): 200 CAPSULE ORAL at 18:17

## 2020-08-24 RX ADMIN — HYDROMORPHONE HYDROCHLORIDE 0.5 MILLIGRAM(S): 2 INJECTION INTRAMUSCULAR; INTRAVENOUS; SUBCUTANEOUS at 04:46

## 2020-08-24 RX ADMIN — OXYCODONE HYDROCHLORIDE 10 MILLIGRAM(S): 5 TABLET ORAL at 03:00

## 2020-08-24 RX ADMIN — CELECOXIB 200 MILLIGRAM(S): 200 CAPSULE ORAL at 18:20

## 2020-08-24 NOTE — PROGRESS NOTE ADULT - SUBJECTIVE AND OBJECTIVE BOX
Ortho Note    Pt comfortable without complaints, pain controlled. Pleasant.   Denies CP, SOB, N/V, numbness/tingling     Vital Signs Last 24 Hrs  T(C): 36.7 (24 Aug 2020 04:50), Max: 37.2 (23 Aug 2020 15:34)  T(F): 98.1 (24 Aug 2020 04:50), Max: 98.9 (23 Aug 2020 15:34)  HR: 88 (24 Aug 2020 04:50) (84 - 92)  BP: 129/74 (24 Aug 2020 04:50) (116/74 - 141/91)  BP(mean): --  RR: 17 (24 Aug 2020 04:50) (15 - 17)  SpO2: 94% (24 Aug 2020 04:50) (93% - 98%)    General: Pt Alert and oriented, NAD  DSG C/D/I  wwp  Sensation: SILT over distal limb and symmetric to contralateral side  Motor: 5/5 EHL/FHL/TA/GS             A/P: 50yMale s/p R BERNARD  - Stable  - Pain Control  - DVT ppx: asa  - PT, WBS: WBAT  - Dispo: HPT pending clearance    Ortho Pager [phone]

## 2020-08-24 NOTE — OCCUPATIONAL THERAPY INITIAL EVALUATION ADULT - PERTINENT HX OF CURRENT PROBLEM, REHAB EVAL
49yo m c/o right hip pain since May 2018. Pt. states his ceiling collapsed and ran into the debride on the floor. Pt. eventually got an MRI and told he needed a THR. Pt. c/o right hip pain with radiation to low back. Pt. has numbness/tingling in b/l les. Pt. has been using a cane since last August and now a rolling walker since March 2020, due to him having a 2 floor walk up. P

## 2020-08-24 NOTE — PROGRESS NOTE ADULT - SUBJECTIVE AND OBJECTIVE BOX
Orthopaedic Surgery Progress Note    Post-operative day #3 s/p R BERNARD    Subjective:     Patient seen and examined. Patient comfortable. Complains of pain with ambulating.  Denies chest pain, shortness of breath, nausea/vomiting, numbness/tingling.    Objective:    Vital Signs Last 24 Hrs  T(C): 36.7 (08-24-20 @ 04:50), Max: 36.7 (08-24-20 @ 04:50)  T(F): 98.1 (08-24-20 @ 04:50), Max: 98.1 (08-24-20 @ 04:50)  HR: 88 (08-24-20 @ 04:50) (88 - 88)  BP: 129/74 (08-24-20 @ 04:50) (129/74 - 129/74)  RR: 17 (08-24-20 @ 04:50) (17 - 17)  SpO2: 94% (08-24-20 @ 04:50) (94% - 94%)  AVSS    PE:  General: Patient alert and oriented, NAD  Dressing: Clean/dry/intact aquacel  Pulses: 2+ DP BLE   Sensation: SILT BLE   Motor: EHL/FHL/TA/GS 5/5 BLE                          10.3   10.32 )-----------( 365      ( 24 Aug 2020 07:01 )             33.0   24 Aug 2020 07:01    139    |  101    |  11     ----------------------------<  93     4.2     |  27     |  0.90     Ca    9.0        24 Aug 2020 07:01        A/P: 50yMale POD#3 s/p R BERNARD  1. Pain control as needed  2. DVT prophylaxis: ASA  3. PT, weight-bearing status: WBAT  4. Dispo: Home w/ home PT    Ortho Pager [phone] Orthopaedic Surgery Progress Note    Post-operative day #3 s/p R BERNARD    Subjective:     Patient seen and examined. Patient comfortable. Complains of pain with ambulating.  Denies chest pain, shortness of breath, nausea/vomiting, numbness/tingling.    Objective:    Vital Signs Last 24 Hrs  T(C): 36.7 (08-24-20 @ 04:50), Max: 36.7 (08-24-20 @ 04:50)  T(F): 98.1 (08-24-20 @ 04:50), Max: 98.1 (08-24-20 @ 04:50)  HR: 88 (08-24-20 @ 04:50) (88 - 88)  BP: 129/74 (08-24-20 @ 04:50) (129/74 - 129/74)  RR: 17 (08-24-20 @ 04:50) (17 - 17)  SpO2: 94% (08-24-20 @ 04:50) (94% - 94%)  AVSS    PE:  General: Patient alert and oriented, NAD  Dressing: Clean/dry/intact aquacel  Pulses: 2+ DP BLE   Sensation: SILT BLE   Motor: EHL/FHL/TA/GS 5/5 BLE                          10.3   10.32 )-----------( 365      ( 24 Aug 2020 07:01 )             33.0   24 Aug 2020 07:01    139    |  101    |  11     ----------------------------<  93     4.2     |  27     |  0.90     Ca    9.0        24 Aug 2020 07:01        A/P: 50yMale POD#3 s/p R BERNARD  1. Pain control as needed  2. DVT prophylaxis: ASA  3. PT, weight-bearing status: WBAT  4. Dispo: Home w/ home PT    ADDENDUM:  Of note, I checked the patient's iStop and found that he has been getting multiple prescriptions for Percocet 10mg from different providers over the last year. Most recently, he was prescribed:   90 pills by Dr. Tonio Kennedy (Pain Management Provider) on 07/30/2020  90 pills by Dr. Tonio Kennedy on 07/01/2020  15 pills by [facility] on 06/26/2020  90 pills by Dr. Tonio Kennedy on 05/29/2020  75 pills by PATTI Casper (Internal Medicine Provider) on 05/01/2020  55 pills by PATTI Casper on 04/09/2020  21 pills by PATTI Casper on 04/02/2020    Ortho Pager [phone] Orthopaedic Surgery Progress Note    Post-operative day #3 s/p R BERNARD    Subjective:     Patient seen and examined. Patient comfortable. Complains of pain with ambulating.  Denies chest pain, shortness of breath, nausea/vomiting, numbness/tingling.    Objective:    Vital Signs Last 24 Hrs  T(C): 36.7 (08-24-20 @ 04:50), Max: 36.7 (08-24-20 @ 04:50)  T(F): 98.1 (08-24-20 @ 04:50), Max: 98.1 (08-24-20 @ 04:50)  HR: 88 (08-24-20 @ 04:50) (88 - 88)  BP: 129/74 (08-24-20 @ 04:50) (129/74 - 129/74)  RR: 17 (08-24-20 @ 04:50) (17 - 17)  SpO2: 94% (08-24-20 @ 04:50) (94% - 94%)  AVSS    PE:  General: Patient alert and oriented, NAD  Dressing: Clean/dry/intact aquacel  Pulses: 2+ DP BLE   Sensation: SILT BLE   Motor: EHL/FHL/TA/GS 5/5 BLE                          10.3   10.32 )-----------( 365      ( 24 Aug 2020 07:01 )             33.0   24 Aug 2020 07:01    139    |  101    |  11     ----------------------------<  93     4.2     |  27     |  0.90     Ca    9.0        24 Aug 2020 07:01        A/P: 50yMale POD#3 s/p R BERNARD  1. Pain control as needed  2. DVT prophylaxis: ASA  3. PT, weight-bearing status: WBAT  4. Dispo: Home w/ home PT    ADDENDUM:  Of note, I checked the patient's iStop and found that he has been getting multiple prescriptions for Percocet 10mg from different providers over the last year. Most recently, he was prescribed:   90 pills by Dr. Tonio Kennedy (Pain Management Provider) on 07/30/2020  90 pills by Dr. Tonio Kennedy on 07/01/2020  15 pills by [facility] on 06/26/2020  90 pills by Dr. Tonio Kennedy on 05/29/2020  75 pills by PATTI Casper (Internal Medicine Provider) on 05/01/2020  55 pills by PATTI Casper on 04/09/2020  21 pills by PATTI Casper on 04/02/2020    Dr. Longoria and the patient discussed that the patient will follow up with his pain management physician at discharge. Dr. Longoria will allow a one time prescription of Oxycodone and Tramadol and then the patient will have to follow up with his PM doctor for further prescriptions.    Ortho Pager [phone] Orthopaedic Surgery Progress Note    Post-operative day #3 s/p R BERNARD    Subjective:     Patient seen and examined. Patient comfortable. Complains of pain with ambulating.  Denies chest pain, shortness of breath, nausea/vomiting, numbness/tingling.    Objective:    Vital Signs Last 24 Hrs  T(C): 36.7 (08-24-20 @ 04:50), Max: 36.7 (08-24-20 @ 04:50)  T(F): 98.1 (08-24-20 @ 04:50), Max: 98.1 (08-24-20 @ 04:50)  HR: 88 (08-24-20 @ 04:50) (88 - 88)  BP: 129/74 (08-24-20 @ 04:50) (129/74 - 129/74)  RR: 17 (08-24-20 @ 04:50) (17 - 17)  SpO2: 94% (08-24-20 @ 04:50) (94% - 94%)  AVSS    PE:  General: Patient alert and oriented, NAD  Dressing: Clean/dry/intact aquacel  Pulses: 2+ DP BLE   Sensation: SILT BLE   Motor: EHL/FHL/TA/GS 5/5 BLE                          10.3   10.32 )-----------( 365      ( 24 Aug 2020 07:01 )             33.0   24 Aug 2020 07:01    139    |  101    |  11     ----------------------------<  93     4.2     |  27     |  0.90     Ca    9.0        24 Aug 2020 07:01        A/P: 50yMale POD#3 s/p R BERNARD  1. Pain control as needed  2. DVT prophylaxis: ASA  3. PT, weight-bearing status: WBAT  4. Dispo: Home w/ home PT    ADDENDUM:  Of note, I checked the patient's iStop and found that he has been getting multiple prescriptions for Percocet 10mg from different providers over the last year. Most recently, he was prescribed:   90 pills by Dr. Tonio Kennedy (Pain Management Provider) on 07/30/2020  90 pills by Dr. Tonio Kennedy on 07/01/2020  15 pills by [facility] on 06/26/2020  90 pills by Dr. Tonio Kennedy on 05/29/2020  75 pills by PATTI Casper (Internal Medicine Provider) on 05/01/2020  55 pills by PATTI Casper on 04/09/2020  21 pills by PATTI Casper on 04/02/2020    Dr. Longoria and the patient discussed that the patient will follow up with his pain management physician at discharge. Dr. Longoria will allow a one time prescription of Oxycodone and Tramadol and then the patient will have to follow up with his PM doctor for further prescriptions. Patient expressed understanding. Prescriptions sent to patient's [facility] in [place].    Ortho Pager [phone]

## 2020-08-24 NOTE — DISCHARGE NOTE NURSING/CASE MANAGEMENT/SOCIAL WORK - PATIENT PORTAL LINK FT
You can access the FollowMyHealth Patient Portal offered by [facility] by registering at the following website: [email or web address]. By joining [employer]’s FollowMyHealth portal, you will also be able to view your health information using other applications (apps) compatible with our system.

## 2020-08-25 PROCEDURE — 97530 THERAPEUTIC ACTIVITIES: CPT

## 2020-08-25 PROCEDURE — 97116 GAIT TRAINING THERAPY: CPT

## 2020-08-25 PROCEDURE — 72170 X-RAY EXAM OF PELVIS: CPT

## 2020-08-25 PROCEDURE — 36415 COLL VENOUS BLD VENIPUNCTURE: CPT

## 2020-08-25 PROCEDURE — 80048 BASIC METABOLIC PNL TOTAL CA: CPT

## 2020-08-25 PROCEDURE — 86850 RBC ANTIBODY SCREEN: CPT

## 2020-08-25 PROCEDURE — 85027 COMPLETE CBC AUTOMATED: CPT

## 2020-08-25 PROCEDURE — 86901 BLOOD TYPING SEROLOGIC RH(D): CPT

## 2020-08-25 PROCEDURE — C1776: CPT

## 2020-08-25 PROCEDURE — 76000 FLUOROSCOPY <1 HR PHYS/QHP: CPT

## 2020-08-25 PROCEDURE — 97161 PT EVAL LOW COMPLEX 20 MIN: CPT

## 2020-08-27 DIAGNOSIS — M87.9 OSTEONECROSIS, UNSPECIFIED: ICD-10-CM

## 2020-08-27 DIAGNOSIS — M16.31 UNILATERAL OSTEOARTHRITIS RESULTING FROM HIP DYSPLASIA, RIGHT HIP: ICD-10-CM

## 2020-09-03 ENCOUNTER — APPOINTMENT (OUTPATIENT)
Dept: ORTHOPEDIC SURGERY | Facility: CLINIC | Age: 50
End: 2020-09-03

## 2020-09-29 ENCOUNTER — NON-APPOINTMENT (OUTPATIENT)
Age: 50
End: 2020-09-29

## 2021-01-01 ENCOUNTER — OUTPATIENT (OUTPATIENT)
Dept: OUTPATIENT SERVICES | Facility: HOSPITAL | Age: 51
LOS: 1 days | End: 2021-01-01
Payer: MEDICAID

## 2021-01-01 DIAGNOSIS — Z41.9 ENCOUNTER FOR PROCEDURE FOR PURPOSES OTHER THAN REMEDYING HEALTH STATE, UNSPECIFIED: Chronic | ICD-10-CM

## 2021-01-01 PROCEDURE — H0002: CPT

## 2021-02-02 ENCOUNTER — HOSPITAL ENCOUNTER (INPATIENT)
Dept: HOSPITAL 74 - YASAS | Age: 51
LOS: 5 days | Discharge: OTHER INSTITUTION | End: 2021-02-07
Attending: ALLERGY & IMMUNOLOGY | Admitting: ALLERGY & IMMUNOLOGY
Payer: COMMERCIAL

## 2021-02-02 VITALS — BODY MASS INDEX: 34 KG/M2

## 2021-02-02 DIAGNOSIS — F19.24: ICD-10-CM

## 2021-02-02 DIAGNOSIS — F14.20: ICD-10-CM

## 2021-02-02 DIAGNOSIS — F10.230: Primary | ICD-10-CM

## 2021-02-02 DIAGNOSIS — F17.213: ICD-10-CM

## 2021-02-02 DIAGNOSIS — Z99.89: ICD-10-CM

## 2021-02-02 DIAGNOSIS — I10: ICD-10-CM

## 2021-02-02 DIAGNOSIS — Z96.642: ICD-10-CM

## 2021-02-02 DIAGNOSIS — Z91.013: ICD-10-CM

## 2021-02-02 DIAGNOSIS — E87.6: ICD-10-CM

## 2021-02-02 DIAGNOSIS — M25.551: ICD-10-CM

## 2021-02-02 LAB
ALBUMIN SERPL-MCNC: 4.2 G/DL (ref 3.4–5)
ALP SERPL-CCNC: 126 U/L (ref 45–117)
ALT SERPL-CCNC: 81 U/L (ref 13–61)
ANION GAP SERPL CALC-SCNC: 9 MMOL/L (ref 8–16)
AST SERPL-CCNC: 79 U/L (ref 15–37)
BILIRUB SERPL-MCNC: 0.7 MG/DL (ref 0.2–1)
BUN SERPL-MCNC: 11.4 MG/DL (ref 7–18)
CALCIUM SERPL-MCNC: 9.8 MG/DL (ref 8.5–10.1)
CHLORIDE SERPL-SCNC: 96 MMOL/L (ref 98–107)
CO2 SERPL-SCNC: 27 MMOL/L (ref 21–32)
CREAT SERPL-MCNC: 1 MG/DL (ref 0.55–1.3)
DEPRECATED RDW RBC AUTO: 13.7 % (ref 11.9–15.9)
GLUCOSE SERPL-MCNC: 124 MG/DL (ref 74–106)
HCT VFR BLD CALC: 38.2 % (ref 35.4–49)
HGB BLD-MCNC: 12.8 GM/DL (ref 11.7–16.9)
MCH RBC QN AUTO: 31.4 PG (ref 25.7–33.7)
MCHC RBC AUTO-ENTMCNC: 33.6 G/DL (ref 32–35.9)
MCV RBC: 93.5 FL (ref 80–96)
PLATELET # BLD AUTO: 412 K/MM3 (ref 134–434)
PMV BLD: 8.6 FL (ref 7.5–11.1)
POTASSIUM SERPLBLD-SCNC: 3.4 MMOL/L (ref 3.5–5.1)
PROT SERPL-MCNC: 8 G/DL (ref 6.4–8.2)
RBC # BLD AUTO: 4.09 M/MM3 (ref 4–5.6)
SODIUM SERPL-SCNC: 132 MMOL/L (ref 136–145)
WBC # BLD AUTO: 8.3 K/MM3 (ref 4–10)

## 2021-02-02 PROCEDURE — U0003 INFECTIOUS AGENT DETECTION BY NUCLEIC ACID (DNA OR RNA); SEVERE ACUTE RESPIRATORY SYNDROME CORONAVIRUS 2 (SARS-COV-2) (CORONAVIRUS DISEASE [COVID-19]), AMPLIFIED PROBE TECHNIQUE, MAKING USE OF HIGH THROUGHPUT TECHNOLOGIES AS DESCRIBED BY CMS-2020-01-R: HCPCS

## 2021-02-02 PROCEDURE — HZ2ZZZZ DETOXIFICATION SERVICES FOR SUBSTANCE ABUSE TREATMENT: ICD-10-PCS | Performed by: ALLERGY & IMMUNOLOGY

## 2021-02-02 PROCEDURE — C9803 HOPD COVID-19 SPEC COLLECT: HCPCS

## 2021-02-02 RX ADMIN — Medication SCH: at 23:07

## 2021-02-02 RX ADMIN — BISMUTH SUBSALICYLATE PRN ML: 262 LIQUID ORAL at 18:27

## 2021-02-02 RX ADMIN — NICOTINE SCH: 14 PATCH, EXTENDED RELEASE TRANSDERMAL at 13:27

## 2021-02-02 RX ADMIN — Medication SCH: at 23:06

## 2021-02-02 RX ADMIN — GABAPENTIN SCH: 400 CAPSULE ORAL at 23:06

## 2021-02-02 RX ADMIN — GABAPENTIN SCH MG: 400 CAPSULE ORAL at 13:27

## 2021-02-02 RX ADMIN — HYDROXYZINE PAMOATE SCH MG: 25 CAPSULE ORAL at 13:27

## 2021-02-02 RX ADMIN — HYDROXYZINE PAMOATE SCH: 25 CAPSULE ORAL at 23:07

## 2021-02-02 RX ADMIN — HYDROXYZINE PAMOATE SCH MG: 25 CAPSULE ORAL at 18:27

## 2021-02-03 RX ADMIN — Medication SCH MG: at 22:38

## 2021-02-03 RX ADMIN — HYDROXYZINE PAMOATE SCH: 25 CAPSULE ORAL at 14:04

## 2021-02-03 RX ADMIN — Medication SCH TAB: at 10:38

## 2021-02-03 RX ADMIN — GABAPENTIN SCH: 400 CAPSULE ORAL at 05:58

## 2021-02-03 RX ADMIN — GABAPENTIN SCH MG: 400 CAPSULE ORAL at 14:00

## 2021-02-03 RX ADMIN — HYDROXYZINE PAMOATE SCH MG: 25 CAPSULE ORAL at 18:33

## 2021-02-03 RX ADMIN — HYDROXYZINE PAMOATE SCH: 25 CAPSULE ORAL at 05:59

## 2021-02-03 RX ADMIN — HYDROXYZINE PAMOATE SCH: 25 CAPSULE ORAL at 10:37

## 2021-02-03 RX ADMIN — LORATADINE SCH MG: 10 TABLET ORAL at 10:37

## 2021-02-03 RX ADMIN — HYDROXYZINE PAMOATE SCH MG: 25 CAPSULE ORAL at 22:38

## 2021-02-03 RX ADMIN — GABAPENTIN SCH MG: 400 CAPSULE ORAL at 22:38

## 2021-02-03 RX ADMIN — POTASSIUM CHLORIDE SCH MEQ: 20 SOLUTION ORAL at 22:38

## 2021-02-03 RX ADMIN — NICOTINE SCH: 14 PATCH, EXTENDED RELEASE TRANSDERMAL at 10:38

## 2021-02-04 LAB
ALBUMIN SERPL-MCNC: 3.4 G/DL (ref 3.4–5)
ALP SERPL-CCNC: 110 U/L (ref 45–117)
ALT SERPL-CCNC: 56 U/L (ref 13–61)
ANION GAP SERPL CALC-SCNC: 5 MMOL/L (ref 8–16)
AST SERPL-CCNC: 22 U/L (ref 15–37)
BILIRUB SERPL-MCNC: 0.6 MG/DL (ref 0.2–1)
BUN SERPL-MCNC: 12.2 MG/DL (ref 7–18)
CALCIUM SERPL-MCNC: 9.2 MG/DL (ref 8.5–10.1)
CHLORIDE SERPL-SCNC: 110 MMOL/L (ref 98–107)
CO2 SERPL-SCNC: 28 MMOL/L (ref 21–32)
CREAT SERPL-MCNC: 0.8 MG/DL (ref 0.55–1.3)
GLUCOSE SERPL-MCNC: 86 MG/DL (ref 74–106)
INR BLD: 1.03 (ref 0.83–1.09)
POTASSIUM SERPLBLD-SCNC: 4.2 MMOL/L (ref 3.5–5.1)
PROT SERPL-MCNC: 6.6 G/DL (ref 6.4–8.2)
PT PNL PPP: 12.6 SEC (ref 9.7–13)
SODIUM SERPL-SCNC: 142 MMOL/L (ref 136–145)

## 2021-02-04 RX ADMIN — GABAPENTIN SCH MG: 400 CAPSULE ORAL at 22:58

## 2021-02-04 RX ADMIN — LORATADINE SCH MG: 10 TABLET ORAL at 10:16

## 2021-02-04 RX ADMIN — Medication SCH TAB: at 10:17

## 2021-02-04 RX ADMIN — IBUPROFEN PRN MG: 400 TABLET, FILM COATED ORAL at 22:57

## 2021-02-04 RX ADMIN — HYDROXYZINE PAMOATE SCH: 25 CAPSULE ORAL at 13:28

## 2021-02-04 RX ADMIN — GABAPENTIN SCH MG: 400 CAPSULE ORAL at 05:26

## 2021-02-04 RX ADMIN — HYDROXYZINE PAMOATE SCH: 25 CAPSULE ORAL at 18:56

## 2021-02-04 RX ADMIN — GABAPENTIN SCH MG: 400 CAPSULE ORAL at 13:26

## 2021-02-04 RX ADMIN — Medication SCH MG: at 22:58

## 2021-02-04 RX ADMIN — HYDROXYZINE PAMOATE SCH MG: 25 CAPSULE ORAL at 06:25

## 2021-02-04 RX ADMIN — HYDROXYZINE PAMOATE SCH: 25 CAPSULE ORAL at 10:16

## 2021-02-04 RX ADMIN — POTASSIUM CHLORIDE SCH MEQ: 20 SOLUTION ORAL at 22:59

## 2021-02-04 RX ADMIN — NICOTINE SCH: 14 PATCH, EXTENDED RELEASE TRANSDERMAL at 10:16

## 2021-02-04 RX ADMIN — Medication SCH MG: at 22:59

## 2021-02-04 RX ADMIN — POTASSIUM CHLORIDE SCH MEQ: 20 SOLUTION ORAL at 10:18

## 2021-02-04 RX ADMIN — HYDROXYZINE PAMOATE SCH: 25 CAPSULE ORAL at 22:59

## 2021-02-05 RX ADMIN — HYDROXYZINE PAMOATE SCH: 25 CAPSULE ORAL at 06:13

## 2021-02-05 RX ADMIN — Medication SCH MG: at 23:00

## 2021-02-05 RX ADMIN — Medication SCH TAB: at 10:10

## 2021-02-05 RX ADMIN — LORATADINE SCH MG: 10 TABLET ORAL at 10:10

## 2021-02-05 RX ADMIN — NICOTINE SCH: 14 PATCH, EXTENDED RELEASE TRANSDERMAL at 10:11

## 2021-02-05 RX ADMIN — HYDROXYZINE PAMOATE SCH: 25 CAPSULE ORAL at 14:19

## 2021-02-05 RX ADMIN — BISMUTH SUBSALICYLATE PRN ML: 262 LIQUID ORAL at 19:25

## 2021-02-05 RX ADMIN — BISMUTH SUBSALICYLATE PRN ML: 262 LIQUID ORAL at 17:56

## 2021-02-05 RX ADMIN — GABAPENTIN SCH MG: 400 CAPSULE ORAL at 23:00

## 2021-02-05 RX ADMIN — HYDROXYZINE PAMOATE SCH: 25 CAPSULE ORAL at 10:11

## 2021-02-05 RX ADMIN — HYDROXYZINE PAMOATE SCH MG: 25 CAPSULE ORAL at 17:54

## 2021-02-05 RX ADMIN — BISMUTH SUBSALICYLATE PRN ML: 262 LIQUID ORAL at 10:13

## 2021-02-05 RX ADMIN — GABAPENTIN SCH MG: 400 CAPSULE ORAL at 06:13

## 2021-02-05 RX ADMIN — GABAPENTIN SCH MG: 400 CAPSULE ORAL at 13:15

## 2021-02-05 RX ADMIN — HYDROXYZINE PAMOATE SCH MG: 25 CAPSULE ORAL at 23:00

## 2021-02-06 RX ADMIN — HYDROXYZINE PAMOATE SCH MG: 25 CAPSULE ORAL at 05:50

## 2021-02-06 RX ADMIN — Medication SCH MG: at 22:47

## 2021-02-06 RX ADMIN — HYDROXYZINE PAMOATE SCH: 25 CAPSULE ORAL at 22:47

## 2021-02-06 RX ADMIN — HYDROXYZINE PAMOATE SCH: 25 CAPSULE ORAL at 13:36

## 2021-02-06 RX ADMIN — GABAPENTIN SCH MG: 400 CAPSULE ORAL at 05:50

## 2021-02-06 RX ADMIN — IBUPROFEN PRN MG: 400 TABLET, FILM COATED ORAL at 17:32

## 2021-02-06 RX ADMIN — GABAPENTIN SCH MG: 400 CAPSULE ORAL at 13:34

## 2021-02-06 RX ADMIN — GABAPENTIN SCH MG: 400 CAPSULE ORAL at 21:47

## 2021-02-06 RX ADMIN — Medication SCH TAB: at 11:08

## 2021-02-06 RX ADMIN — Medication SCH: at 22:47

## 2021-02-06 RX ADMIN — LORATADINE SCH MG: 10 TABLET ORAL at 11:08

## 2021-02-06 RX ADMIN — HYDROXYZINE PAMOATE SCH: 25 CAPSULE ORAL at 11:08

## 2021-02-06 RX ADMIN — HYDROXYZINE PAMOATE SCH MG: 25 CAPSULE ORAL at 17:31

## 2021-02-06 RX ADMIN — NICOTINE SCH: 14 PATCH, EXTENDED RELEASE TRANSDERMAL at 11:08

## 2021-02-07 ENCOUNTER — HOSPITAL ENCOUNTER (INPATIENT)
Dept: HOSPITAL 74 - YASAS | Age: 51
LOS: 4 days | Discharge: HOME | DRG: 772 | End: 2021-02-11
Attending: ALLERGY & IMMUNOLOGY | Admitting: ALLERGY & IMMUNOLOGY
Payer: COMMERCIAL

## 2021-02-07 VITALS — HEART RATE: 108 BPM | DIASTOLIC BLOOD PRESSURE: 86 MMHG | SYSTOLIC BLOOD PRESSURE: 138 MMHG

## 2021-02-07 VITALS — TEMPERATURE: 97.3 F

## 2021-02-07 DIAGNOSIS — J30.2: ICD-10-CM

## 2021-02-07 DIAGNOSIS — Z91.013: ICD-10-CM

## 2021-02-07 DIAGNOSIS — Z96.643: ICD-10-CM

## 2021-02-07 DIAGNOSIS — F17.210: ICD-10-CM

## 2021-02-07 DIAGNOSIS — Z99.89: ICD-10-CM

## 2021-02-07 DIAGNOSIS — F14.20: ICD-10-CM

## 2021-02-07 DIAGNOSIS — I10: ICD-10-CM

## 2021-02-07 DIAGNOSIS — F10.20: Primary | ICD-10-CM

## 2021-02-07 PROCEDURE — C9803 HOPD COVID-19 SPEC COLLECT: HCPCS

## 2021-02-07 PROCEDURE — HZ42ZZZ GROUP COUNSELING FOR SUBSTANCE ABUSE TREATMENT, COGNITIVE-BEHAVIORAL: ICD-10-PCS | Performed by: ALLERGY & IMMUNOLOGY

## 2021-02-07 PROCEDURE — U0003 INFECTIOUS AGENT DETECTION BY NUCLEIC ACID (DNA OR RNA); SEVERE ACUTE RESPIRATORY SYNDROME CORONAVIRUS 2 (SARS-COV-2) (CORONAVIRUS DISEASE [COVID-19]), AMPLIFIED PROBE TECHNIQUE, MAKING USE OF HIGH THROUGHPUT TECHNOLOGIES AS DESCRIBED BY CMS-2020-01-R: HCPCS

## 2021-02-07 RX ADMIN — LORATADINE SCH MG: 10 TABLET ORAL at 11:41

## 2021-02-07 RX ADMIN — Medication SCH TAB: at 11:07

## 2021-02-07 RX ADMIN — Medication SCH MG: at 21:18

## 2021-02-07 RX ADMIN — HYDROXYZINE PAMOATE SCH MG: 25 CAPSULE ORAL at 11:06

## 2021-02-07 RX ADMIN — GABAPENTIN SCH MG: 400 CAPSULE ORAL at 21:17

## 2021-02-07 RX ADMIN — GABAPENTIN SCH MG: 400 CAPSULE ORAL at 13:11

## 2021-02-07 RX ADMIN — Medication SCH MG: at 21:17

## 2021-02-07 RX ADMIN — HYDROXYZINE PAMOATE SCH MG: 25 CAPSULE ORAL at 05:47

## 2021-02-07 RX ADMIN — HYDROXYZINE PAMOATE SCH: 25 CAPSULE ORAL at 14:55

## 2021-02-07 RX ADMIN — GABAPENTIN SCH MG: 400 CAPSULE ORAL at 05:46

## 2021-02-07 RX ADMIN — GABAPENTIN SCH: 400 CAPSULE ORAL at 16:03

## 2021-02-07 RX ADMIN — NICOTINE SCH: 14 PATCH, EXTENDED RELEASE TRANSDERMAL at 11:07

## 2021-02-08 RX ADMIN — GABAPENTIN SCH MG: 400 CAPSULE ORAL at 21:30

## 2021-02-08 RX ADMIN — METHOCARBAMOL PRN MG: 500 TABLET ORAL at 14:31

## 2021-02-08 RX ADMIN — Medication SCH MG: at 21:30

## 2021-02-08 RX ADMIN — GABAPENTIN SCH MG: 400 CAPSULE ORAL at 13:45

## 2021-02-08 RX ADMIN — NICOTINE SCH: 14 PATCH, EXTENDED RELEASE TRANSDERMAL at 11:55

## 2021-02-08 RX ADMIN — LORATADINE SCH MG: 10 TABLET ORAL at 11:55

## 2021-02-08 RX ADMIN — IBUPROFEN PRN MG: 400 TABLET, FILM COATED ORAL at 21:31

## 2021-02-08 RX ADMIN — GABAPENTIN SCH MG: 400 CAPSULE ORAL at 06:12

## 2021-02-08 RX ADMIN — Medication SCH TAB: at 11:55

## 2021-02-08 RX ADMIN — METHOCARBAMOL PRN MG: 500 TABLET ORAL at 21:30

## 2021-02-09 RX ADMIN — Medication SCH: at 21:50

## 2021-02-09 RX ADMIN — GABAPENTIN SCH: 400 CAPSULE ORAL at 21:50

## 2021-02-09 RX ADMIN — Medication SCH TAB: at 10:20

## 2021-02-09 RX ADMIN — IBUPROFEN PRN MG: 400 TABLET, FILM COATED ORAL at 14:15

## 2021-02-09 RX ADMIN — IBUPROFEN PRN MG: 400 TABLET, FILM COATED ORAL at 19:40

## 2021-02-09 RX ADMIN — METHOCARBAMOL PRN MG: 500 TABLET ORAL at 16:55

## 2021-02-09 RX ADMIN — METHOCARBAMOL PRN MG: 500 TABLET ORAL at 10:21

## 2021-02-09 RX ADMIN — GABAPENTIN SCH MG: 400 CAPSULE ORAL at 06:02

## 2021-02-09 RX ADMIN — Medication SCH: at 21:49

## 2021-02-09 RX ADMIN — ACETAMINOPHEN PRN MG: 325 TABLET ORAL at 06:03

## 2021-02-09 RX ADMIN — GABAPENTIN SCH MG: 400 CAPSULE ORAL at 14:14

## 2021-02-09 RX ADMIN — NICOTINE SCH: 14 PATCH, EXTENDED RELEASE TRANSDERMAL at 10:20

## 2021-02-09 RX ADMIN — LORATADINE SCH MG: 10 TABLET ORAL at 10:19

## 2021-02-10 RX ADMIN — NICOTINE SCH: 14 PATCH, EXTENDED RELEASE TRANSDERMAL at 10:10

## 2021-02-10 RX ADMIN — METHOCARBAMOL PRN MG: 500 TABLET ORAL at 06:06

## 2021-02-10 RX ADMIN — IBUPROFEN PRN MG: 400 TABLET, FILM COATED ORAL at 06:06

## 2021-02-10 RX ADMIN — Medication SCH TAB: at 10:10

## 2021-02-10 RX ADMIN — ACETAMINOPHEN PRN MG: 325 TABLET ORAL at 22:52

## 2021-02-10 RX ADMIN — GABAPENTIN SCH MG: 400 CAPSULE ORAL at 13:16

## 2021-02-10 RX ADMIN — LORATADINE SCH MG: 10 TABLET ORAL at 10:10

## 2021-02-10 RX ADMIN — GABAPENTIN SCH: 400 CAPSULE ORAL at 21:59

## 2021-02-10 RX ADMIN — IBUPROFEN PRN MG: 400 TABLET, FILM COATED ORAL at 18:10

## 2021-02-10 RX ADMIN — GABAPENTIN SCH MG: 400 CAPSULE ORAL at 06:07

## 2021-02-10 RX ADMIN — IBUPROFEN PRN MG: 400 TABLET, FILM COATED ORAL at 12:33

## 2021-02-10 RX ADMIN — Medication SCH: at 21:59

## 2021-02-10 RX ADMIN — ACETAMINOPHEN PRN MG: 325 TABLET ORAL at 10:11

## 2021-02-10 RX ADMIN — METHOCARBAMOL PRN MG: 500 TABLET ORAL at 12:33

## 2021-02-11 VITALS — HEART RATE: 97 BPM | TEMPERATURE: 97.5 F | SYSTOLIC BLOOD PRESSURE: 135 MMHG | DIASTOLIC BLOOD PRESSURE: 94 MMHG

## 2021-02-11 RX ADMIN — IBUPROFEN PRN MG: 400 TABLET, FILM COATED ORAL at 07:09

## 2021-02-11 RX ADMIN — METHOCARBAMOL PRN MG: 500 TABLET ORAL at 07:09

## 2021-02-11 RX ADMIN — GABAPENTIN SCH MG: 400 CAPSULE ORAL at 07:09

## 2021-03-05 ENCOUNTER — HOSPITAL ENCOUNTER (INPATIENT)
Dept: HOSPITAL 74 - YASAS | Age: 51
LOS: 3 days | Discharge: HOME | End: 2021-03-08
Attending: ALLERGY & IMMUNOLOGY | Admitting: ALLERGY & IMMUNOLOGY
Payer: COMMERCIAL

## 2021-03-05 VITALS — BODY MASS INDEX: 34.4 KG/M2

## 2021-03-05 DIAGNOSIS — Z96.643: ICD-10-CM

## 2021-03-05 DIAGNOSIS — Z91.013: ICD-10-CM

## 2021-03-05 DIAGNOSIS — F14.20: ICD-10-CM

## 2021-03-05 DIAGNOSIS — F19.24: ICD-10-CM

## 2021-03-05 DIAGNOSIS — I10: ICD-10-CM

## 2021-03-05 DIAGNOSIS — J30.89: ICD-10-CM

## 2021-03-05 DIAGNOSIS — Z99.89: ICD-10-CM

## 2021-03-05 DIAGNOSIS — E66.9: ICD-10-CM

## 2021-03-05 DIAGNOSIS — E87.6: ICD-10-CM

## 2021-03-05 DIAGNOSIS — F10.230: Primary | ICD-10-CM

## 2021-03-05 DIAGNOSIS — F17.210: ICD-10-CM

## 2021-03-05 LAB
ALBUMIN SERPL-MCNC: 4.3 G/DL (ref 3.4–5)
ALP SERPL-CCNC: 115 U/L (ref 45–117)
ALT SERPL-CCNC: 29 U/L (ref 13–61)
ANION GAP SERPL CALC-SCNC: 9 MMOL/L (ref 8–16)
AST SERPL-CCNC: 24 U/L (ref 15–37)
BILIRUB SERPL-MCNC: 0.7 MG/DL (ref 0.2–1)
BUN SERPL-MCNC: 7.2 MG/DL (ref 7–18)
CALCIUM SERPL-MCNC: 9.5 MG/DL (ref 8.5–10.1)
CHLORIDE SERPL-SCNC: 107 MMOL/L (ref 98–107)
CO2 SERPL-SCNC: 24 MMOL/L (ref 21–32)
CREAT SERPL-MCNC: 1 MG/DL (ref 0.55–1.3)
DEPRECATED RDW RBC AUTO: 14.9 % (ref 11.9–15.9)
GLUCOSE SERPL-MCNC: 115 MG/DL (ref 74–106)
HCT VFR BLD CALC: 42.7 % (ref 35.4–49)
HGB BLD-MCNC: 13.9 GM/DL (ref 11.7–16.9)
MCH RBC QN AUTO: 30.5 PG (ref 25.7–33.7)
MCHC RBC AUTO-ENTMCNC: 32.6 G/DL (ref 32–35.9)
MCV RBC: 93.6 FL (ref 80–96)
PLATELET # BLD AUTO: 453 K/MM3 (ref 134–434)
PMV BLD: 7.8 FL (ref 7.5–11.1)
POTASSIUM SERPLBLD-SCNC: 3.2 MMOL/L (ref 3.5–5.1)
PROT SERPL-MCNC: 8 G/DL (ref 6.4–8.2)
RBC # BLD AUTO: 4.56 M/MM3 (ref 4–5.6)
SODIUM SERPL-SCNC: 140 MMOL/L (ref 136–145)
WBC # BLD AUTO: 6.7 K/MM3 (ref 4–10)

## 2021-03-05 PROCEDURE — U0003 INFECTIOUS AGENT DETECTION BY NUCLEIC ACID (DNA OR RNA); SEVERE ACUTE RESPIRATORY SYNDROME CORONAVIRUS 2 (SARS-COV-2) (CORONAVIRUS DISEASE [COVID-19]), AMPLIFIED PROBE TECHNIQUE, MAKING USE OF HIGH THROUGHPUT TECHNOLOGIES AS DESCRIBED BY CMS-2020-01-R: HCPCS

## 2021-03-05 PROCEDURE — HZ2ZZZZ DETOXIFICATION SERVICES FOR SUBSTANCE ABUSE TREATMENT: ICD-10-PCS | Performed by: ALLERGY & IMMUNOLOGY

## 2021-03-05 PROCEDURE — C9803 HOPD COVID-19 SPEC COLLECT: HCPCS

## 2021-03-05 RX ADMIN — Medication SCH TAB: at 12:34

## 2021-03-05 RX ADMIN — HYDROXYZINE PAMOATE SCH: 25 CAPSULE ORAL at 18:37

## 2021-03-05 RX ADMIN — GABAPENTIN SCH MG: 400 CAPSULE ORAL at 12:34

## 2021-03-05 RX ADMIN — HYDROXYZINE PAMOATE SCH: 25 CAPSULE ORAL at 15:09

## 2021-03-06 RX ADMIN — GABAPENTIN SCH MG: 400 CAPSULE ORAL at 06:16

## 2021-03-06 RX ADMIN — GABAPENTIN SCH: 400 CAPSULE ORAL at 00:15

## 2021-03-06 RX ADMIN — HYDROXYZINE PAMOATE SCH: 25 CAPSULE ORAL at 00:14

## 2021-03-06 RX ADMIN — LORATADINE SCH MG: 10 TABLET ORAL at 10:06

## 2021-03-06 RX ADMIN — Medication SCH: at 23:11

## 2021-03-06 RX ADMIN — HYDROXYZINE PAMOATE SCH: 25 CAPSULE ORAL at 23:11

## 2021-03-06 RX ADMIN — HYDROXYZINE PAMOATE SCH MG: 25 CAPSULE ORAL at 13:19

## 2021-03-06 RX ADMIN — HYDROXYZINE PAMOATE SCH: 25 CAPSULE ORAL at 06:17

## 2021-03-06 RX ADMIN — GABAPENTIN SCH: 400 CAPSULE ORAL at 00:14

## 2021-03-06 RX ADMIN — Medication SCH: at 00:14

## 2021-03-06 RX ADMIN — HYDROXYZINE PAMOATE SCH MG: 25 CAPSULE ORAL at 10:06

## 2021-03-06 RX ADMIN — GABAPENTIN SCH MG: 400 CAPSULE ORAL at 13:20

## 2021-03-06 RX ADMIN — Medication SCH TAB: at 10:06

## 2021-03-06 RX ADMIN — GABAPENTIN SCH: 400 CAPSULE ORAL at 23:11

## 2021-03-06 RX ADMIN — HYDROXYZINE PAMOATE SCH: 25 CAPSULE ORAL at 19:35

## 2021-03-07 RX ADMIN — HYDROXYZINE PAMOATE SCH: 25 CAPSULE ORAL at 23:04

## 2021-03-07 RX ADMIN — IBUPROFEN PRN MG: 400 TABLET, FILM COATED ORAL at 01:57

## 2021-03-07 RX ADMIN — Medication SCH TAB: at 10:32

## 2021-03-07 RX ADMIN — HYDROXYZINE PAMOATE SCH: 25 CAPSULE ORAL at 07:23

## 2021-03-07 RX ADMIN — LORATADINE SCH MG: 10 TABLET ORAL at 10:32

## 2021-03-07 RX ADMIN — GABAPENTIN SCH MG: 400 CAPSULE ORAL at 05:29

## 2021-03-07 RX ADMIN — Medication SCH MG: at 23:00

## 2021-03-07 RX ADMIN — IBUPROFEN PRN MG: 400 TABLET, FILM COATED ORAL at 14:22

## 2021-03-07 RX ADMIN — GABAPENTIN SCH MG: 400 CAPSULE ORAL at 14:20

## 2021-03-07 RX ADMIN — HYDROXYZINE PAMOATE SCH: 25 CAPSULE ORAL at 10:32

## 2021-03-07 RX ADMIN — METHOCARBAMOL PRN MG: 500 TABLET ORAL at 01:57

## 2021-03-07 RX ADMIN — GABAPENTIN SCH MG: 400 CAPSULE ORAL at 23:00

## 2021-03-07 RX ADMIN — HYDROXYZINE PAMOATE SCH MG: 25 CAPSULE ORAL at 14:19

## 2021-03-07 RX ADMIN — METHOCARBAMOL PRN MG: 500 TABLET ORAL at 14:23

## 2021-03-07 RX ADMIN — HYDROXYZINE PAMOATE SCH: 25 CAPSULE ORAL at 19:06

## 2021-03-08 VITALS — TEMPERATURE: 98 F | DIASTOLIC BLOOD PRESSURE: 78 MMHG | SYSTOLIC BLOOD PRESSURE: 118 MMHG | HEART RATE: 88 BPM

## 2021-03-08 RX ADMIN — Medication SCH TAB: at 10:34

## 2021-03-08 RX ADMIN — LORATADINE SCH MG: 10 TABLET ORAL at 10:34

## 2021-03-08 RX ADMIN — HYDROXYZINE PAMOATE SCH MG: 25 CAPSULE ORAL at 05:20

## 2021-03-08 RX ADMIN — HYDROXYZINE PAMOATE SCH: 25 CAPSULE ORAL at 10:35

## 2021-03-08 RX ADMIN — GABAPENTIN SCH MG: 400 CAPSULE ORAL at 05:20

## 2021-03-15 DIAGNOSIS — Z71.89 OTHER SPECIFIED COUNSELING: ICD-10-CM

## 2021-03-16 PROBLEM — R52 PAIN, UNSPECIFIED: Chronic | Status: ACTIVE | Noted: 2020-08-20

## 2021-05-14 ENCOUNTER — HOSPITAL ENCOUNTER (INPATIENT)
Dept: HOSPITAL 74 - YASAS | Age: 51
LOS: 4 days | Discharge: HOME | End: 2021-05-18
Attending: ALLERGY & IMMUNOLOGY | Admitting: ALLERGY & IMMUNOLOGY
Payer: COMMERCIAL

## 2021-05-14 VITALS — BODY MASS INDEX: 34 KG/M2

## 2021-05-14 DIAGNOSIS — R73.9: ICD-10-CM

## 2021-05-14 DIAGNOSIS — F14.20: ICD-10-CM

## 2021-05-14 DIAGNOSIS — F19.24: ICD-10-CM

## 2021-05-14 DIAGNOSIS — I10: ICD-10-CM

## 2021-05-14 DIAGNOSIS — F17.210: ICD-10-CM

## 2021-05-14 DIAGNOSIS — Z96.643: ICD-10-CM

## 2021-05-14 DIAGNOSIS — Z99.89: ICD-10-CM

## 2021-05-14 DIAGNOSIS — Z91.013: ICD-10-CM

## 2021-05-14 DIAGNOSIS — F10.230: Primary | ICD-10-CM

## 2021-05-14 PROCEDURE — U0003 INFECTIOUS AGENT DETECTION BY NUCLEIC ACID (DNA OR RNA); SEVERE ACUTE RESPIRATORY SYNDROME CORONAVIRUS 2 (SARS-COV-2) (CORONAVIRUS DISEASE [COVID-19]), AMPLIFIED PROBE TECHNIQUE, MAKING USE OF HIGH THROUGHPUT TECHNOLOGIES AS DESCRIBED BY CMS-2020-01-R: HCPCS

## 2021-05-14 PROCEDURE — HZ2ZZZZ DETOXIFICATION SERVICES FOR SUBSTANCE ABUSE TREATMENT: ICD-10-PCS | Performed by: ALLERGY & IMMUNOLOGY

## 2021-05-14 PROCEDURE — U0005 INFEC AGEN DETEC AMPLI PROBE: HCPCS

## 2021-05-14 PROCEDURE — C9803 HOPD COVID-19 SPEC COLLECT: HCPCS

## 2021-05-14 RX ADMIN — Medication SCH MG: at 22:33

## 2021-05-14 RX ADMIN — GABAPENTIN SCH MG: 100 CAPSULE ORAL at 22:33

## 2021-05-14 RX ADMIN — HYDROXYZINE PAMOATE SCH MG: 25 CAPSULE ORAL at 22:33

## 2021-05-14 RX ADMIN — HYDROXYZINE PAMOATE SCH MG: 25 CAPSULE ORAL at 19:01

## 2021-05-15 LAB
ALBUMIN SERPL-MCNC: 3.6 G/DL (ref 3.4–5)
ALP SERPL-CCNC: 118 U/L (ref 45–117)
ALT SERPL-CCNC: 25 U/L (ref 13–61)
ANION GAP SERPL CALC-SCNC: 7 MMOL/L (ref 8–16)
AST SERPL-CCNC: 21 U/L (ref 15–37)
BILIRUB SERPL-MCNC: 1 MG/DL (ref 0.2–1)
BUN SERPL-MCNC: 9.3 MG/DL (ref 7–18)
CALCIUM SERPL-MCNC: 9.2 MG/DL (ref 8.5–10.1)
CHLORIDE SERPL-SCNC: 105 MMOL/L (ref 98–107)
CO2 SERPL-SCNC: 27 MMOL/L (ref 21–32)
CREAT SERPL-MCNC: 1.1 MG/DL (ref 0.55–1.3)
DEPRECATED RDW RBC AUTO: 14.4 % (ref 11.9–15.9)
GLUCOSE SERPL-MCNC: 141 MG/DL (ref 74–106)
HCT VFR BLD CALC: 40 % (ref 35.4–49)
HGB BLD-MCNC: 13.6 GM/DL (ref 11.7–16.9)
MCH RBC QN AUTO: 32.4 PG (ref 25.7–33.7)
MCHC RBC AUTO-ENTMCNC: 33.9 G/DL (ref 32–35.9)
MCV RBC: 95.5 FL (ref 80–96)
PLATELET # BLD AUTO: 426 K/MM3 (ref 134–434)
PMV BLD: 8 FL (ref 7.5–11.1)
PROT SERPL-MCNC: 7 G/DL (ref 6.4–8.2)
RBC # BLD AUTO: 4.18 M/MM3 (ref 4–5.6)
SODIUM SERPL-SCNC: 139 MMOL/L (ref 136–145)
WBC # BLD AUTO: 5.3 K/MM3 (ref 4–10)

## 2021-05-15 RX ADMIN — GABAPENTIN SCH MG: 100 CAPSULE ORAL at 23:09

## 2021-05-15 RX ADMIN — HYDROXYZINE PAMOATE SCH MG: 25 CAPSULE ORAL at 23:09

## 2021-05-15 RX ADMIN — HYDROXYZINE PAMOATE SCH: 25 CAPSULE ORAL at 15:24

## 2021-05-15 RX ADMIN — HYDROXYZINE PAMOATE SCH MG: 25 CAPSULE ORAL at 18:06

## 2021-05-15 RX ADMIN — HYDROXYZINE PAMOATE SCH MG: 25 CAPSULE ORAL at 06:56

## 2021-05-15 RX ADMIN — HYDROXYZINE PAMOATE SCH MG: 25 CAPSULE ORAL at 11:12

## 2021-05-15 RX ADMIN — Medication SCH TAB: at 11:12

## 2021-05-15 RX ADMIN — Medication SCH MG: at 23:10

## 2021-05-15 RX ADMIN — GABAPENTIN SCH: 100 CAPSULE ORAL at 15:23

## 2021-05-15 RX ADMIN — GABAPENTIN SCH MG: 100 CAPSULE ORAL at 06:56

## 2021-05-16 RX ADMIN — HYDROXYZINE PAMOATE SCH MG: 25 CAPSULE ORAL at 06:07

## 2021-05-16 RX ADMIN — Medication SCH TAB: at 10:46

## 2021-05-16 RX ADMIN — GABAPENTIN SCH MG: 100 CAPSULE ORAL at 13:58

## 2021-05-16 RX ADMIN — HYDROXYZINE PAMOATE SCH MG: 25 CAPSULE ORAL at 13:58

## 2021-05-16 RX ADMIN — Medication SCH MG: at 22:14

## 2021-05-16 RX ADMIN — GABAPENTIN SCH MG: 100 CAPSULE ORAL at 06:06

## 2021-05-16 RX ADMIN — HYDROXYZINE PAMOATE SCH MG: 25 CAPSULE ORAL at 22:15

## 2021-05-16 RX ADMIN — HYDROXYZINE PAMOATE SCH MG: 25 CAPSULE ORAL at 10:46

## 2021-05-16 RX ADMIN — GABAPENTIN SCH MG: 100 CAPSULE ORAL at 22:14

## 2021-05-16 RX ADMIN — HYDROXYZINE PAMOATE SCH: 25 CAPSULE ORAL at 18:35

## 2021-05-16 RX ADMIN — Medication SCH MG: at 22:17

## 2021-05-17 RX ADMIN — GABAPENTIN SCH MG: 100 CAPSULE ORAL at 14:12

## 2021-05-17 RX ADMIN — Medication SCH: at 23:08

## 2021-05-17 RX ADMIN — HYDROXYZINE PAMOATE SCH MG: 25 CAPSULE ORAL at 14:12

## 2021-05-17 RX ADMIN — Medication SCH TAB: at 10:12

## 2021-05-17 RX ADMIN — HYDROXYZINE PAMOATE SCH MG: 25 CAPSULE ORAL at 17:47

## 2021-05-17 RX ADMIN — HYDROXYZINE PAMOATE SCH: 25 CAPSULE ORAL at 23:08

## 2021-05-17 RX ADMIN — GABAPENTIN SCH: 100 CAPSULE ORAL at 23:08

## 2021-05-17 RX ADMIN — HYDROXYZINE PAMOATE SCH MG: 25 CAPSULE ORAL at 06:38

## 2021-05-17 RX ADMIN — GABAPENTIN SCH MG: 100 CAPSULE ORAL at 06:38

## 2021-05-17 RX ADMIN — HYDROXYZINE PAMOATE SCH MG: 25 CAPSULE ORAL at 10:12

## 2021-05-18 VITALS — DIASTOLIC BLOOD PRESSURE: 84 MMHG | TEMPERATURE: 98.7 F | SYSTOLIC BLOOD PRESSURE: 142 MMHG | HEART RATE: 105 BPM

## 2021-05-18 RX ADMIN — Medication SCH TAB: at 10:18

## 2021-05-18 RX ADMIN — HYDROXYZINE PAMOATE SCH: 25 CAPSULE ORAL at 13:33

## 2021-05-18 RX ADMIN — GABAPENTIN SCH MG: 100 CAPSULE ORAL at 06:28

## 2021-05-18 RX ADMIN — HYDROXYZINE PAMOATE SCH MG: 25 CAPSULE ORAL at 06:28

## 2021-05-18 RX ADMIN — HYDROXYZINE PAMOATE SCH: 25 CAPSULE ORAL at 11:47

## 2021-08-02 ENCOUNTER — HOSPITAL ENCOUNTER (INPATIENT)
Dept: HOSPITAL 74 - YASAS | Age: 51
LOS: 4 days | Discharge: OTHER INSTITUTION | End: 2021-08-06
Attending: ALLERGY & IMMUNOLOGY | Admitting: ALLERGY & IMMUNOLOGY
Payer: COMMERCIAL

## 2021-08-02 VITALS — BODY MASS INDEX: 35.2 KG/M2

## 2021-08-02 DIAGNOSIS — I10: ICD-10-CM

## 2021-08-02 DIAGNOSIS — F10.230: Primary | ICD-10-CM

## 2021-08-02 DIAGNOSIS — Z96.643: ICD-10-CM

## 2021-08-02 DIAGNOSIS — F14.20: ICD-10-CM

## 2021-08-02 DIAGNOSIS — R00.0: ICD-10-CM

## 2021-08-02 DIAGNOSIS — Z91.013: ICD-10-CM

## 2021-08-02 DIAGNOSIS — M19.90: ICD-10-CM

## 2021-08-02 DIAGNOSIS — F17.210: ICD-10-CM

## 2021-08-02 DIAGNOSIS — R26.2: ICD-10-CM

## 2021-08-02 DIAGNOSIS — Z87.438: ICD-10-CM

## 2021-08-02 DIAGNOSIS — E66.9: ICD-10-CM

## 2021-08-02 DIAGNOSIS — F19.24: ICD-10-CM

## 2021-08-02 DIAGNOSIS — Z59.0: ICD-10-CM

## 2021-08-02 PROCEDURE — U0003 INFECTIOUS AGENT DETECTION BY NUCLEIC ACID (DNA OR RNA); SEVERE ACUTE RESPIRATORY SYNDROME CORONAVIRUS 2 (SARS-COV-2) (CORONAVIRUS DISEASE [COVID-19]), AMPLIFIED PROBE TECHNIQUE, MAKING USE OF HIGH THROUGHPUT TECHNOLOGIES AS DESCRIBED BY CMS-2020-01-R: HCPCS

## 2021-08-02 PROCEDURE — C9803 HOPD COVID-19 SPEC COLLECT: HCPCS

## 2021-08-02 PROCEDURE — HZ2ZZZZ DETOXIFICATION SERVICES FOR SUBSTANCE ABUSE TREATMENT: ICD-10-PCS | Performed by: ALLERGY & IMMUNOLOGY

## 2021-08-02 PROCEDURE — U0005 INFEC AGEN DETEC AMPLI PROBE: HCPCS

## 2021-08-02 RX ADMIN — HYDROXYZINE PAMOATE SCH MG: 25 CAPSULE ORAL at 23:09

## 2021-08-02 RX ADMIN — HYDROXYZINE PAMOATE SCH MG: 25 CAPSULE ORAL at 16:08

## 2021-08-02 RX ADMIN — Medication SCH MG: at 23:08

## 2021-08-02 RX ADMIN — Medication SCH TAB: at 16:08

## 2021-08-02 RX ADMIN — Medication SCH MG: at 23:09

## 2021-08-02 RX ADMIN — HYDROXYZINE PAMOATE SCH MG: 25 CAPSULE ORAL at 18:26

## 2021-08-02 SDOH — ECONOMIC STABILITY - HOUSING INSECURITY: HOMELESSNESS: Z59.0

## 2021-08-03 LAB
ALBUMIN SERPL-MCNC: 3.3 G/DL (ref 3.4–5)
ALP SERPL-CCNC: 88 U/L (ref 45–117)
ALT SERPL-CCNC: 43 U/L (ref 13–61)
ANION GAP SERPL CALC-SCNC: 5 MMOL/L (ref 8–16)
AST SERPL-CCNC: 21 U/L (ref 15–37)
BILIRUB SERPL-MCNC: 1.2 MG/DL (ref 0.2–1)
BUN SERPL-MCNC: 12.2 MG/DL (ref 7–18)
CALCIUM SERPL-MCNC: 8.5 MG/DL (ref 8.5–10.1)
CHLORIDE SERPL-SCNC: 107 MMOL/L (ref 98–107)
CO2 SERPL-SCNC: 28 MMOL/L (ref 21–32)
CREAT SERPL-MCNC: 0.9 MG/DL (ref 0.55–1.3)
DEPRECATED RDW RBC AUTO: 13.3 % (ref 11.9–15.9)
GLUCOSE SERPL-MCNC: 88 MG/DL (ref 74–106)
HCT VFR BLD CALC: 40.7 % (ref 35.4–49)
HGB BLD-MCNC: 13.8 GM/DL (ref 11.7–16.9)
HIV 1+2 AB+HIV1 P24 AG SERPL QL IA: NEGATIVE
MCH RBC QN AUTO: 33 PG (ref 25.7–33.7)
MCHC RBC AUTO-ENTMCNC: 33.9 G/DL (ref 32–35.9)
MCV RBC: 97.3 FL (ref 80–96)
PLATELET # BLD AUTO: 315 10^3/UL (ref 134–434)
PMV BLD: 8.6 FL (ref 7.5–11.1)
PROT SERPL-MCNC: 6.2 G/DL (ref 6.4–8.2)
RBC # BLD AUTO: 4.18 M/MM3 (ref 4–5.6)
SODIUM SERPL-SCNC: 140 MMOL/L (ref 136–145)
WBC # BLD AUTO: 3.3 K/MM3 (ref 4–10)

## 2021-08-03 RX ADMIN — HYDROXYZINE PAMOATE SCH: 25 CAPSULE ORAL at 06:15

## 2021-08-03 RX ADMIN — Medication SCH MG: at 22:48

## 2021-08-03 RX ADMIN — HYDROXYZINE PAMOATE PRN MG: 25 CAPSULE ORAL at 18:10

## 2021-08-03 RX ADMIN — Medication SCH TAB: at 10:05

## 2021-08-03 RX ADMIN — METHOCARBAMOL PRN MG: 500 TABLET ORAL at 18:10

## 2021-08-03 RX ADMIN — Medication SCH MG: at 22:47

## 2021-08-04 RX ADMIN — Medication SCH MG: at 22:32

## 2021-08-04 RX ADMIN — Medication SCH TAB: at 10:15

## 2021-08-04 RX ADMIN — METHOCARBAMOL PRN MG: 500 TABLET ORAL at 22:32

## 2021-08-04 RX ADMIN — Medication SCH EACH: at 22:29

## 2021-08-04 RX ADMIN — HYDROXYZINE PAMOATE PRN MG: 25 CAPSULE ORAL at 22:32

## 2021-08-05 RX ADMIN — Medication SCH EACH: at 23:53

## 2021-08-05 RX ADMIN — Medication SCH: at 23:53

## 2021-08-05 RX ADMIN — Medication SCH TAB: at 10:49

## 2021-08-05 RX ADMIN — Medication SCH: at 23:54

## 2021-08-05 RX ADMIN — LIDOCAINE SCH PATCH: 50 PATCH TOPICAL at 10:49

## 2021-08-06 ENCOUNTER — HOSPITAL ENCOUNTER (INPATIENT)
Dept: HOSPITAL 74 - YASAS | Age: 51
LOS: 3 days | Discharge: LEFT BEFORE DISCHARGE | DRG: 770 | End: 2021-08-09
Attending: ALLERGY & IMMUNOLOGY | Admitting: ALLERGY & IMMUNOLOGY
Payer: COMMERCIAL

## 2021-08-06 VITALS — HEART RATE: 83 BPM | TEMPERATURE: 98.6 F | SYSTOLIC BLOOD PRESSURE: 113 MMHG | DIASTOLIC BLOOD PRESSURE: 76 MMHG

## 2021-08-06 DIAGNOSIS — R26.2: ICD-10-CM

## 2021-08-06 DIAGNOSIS — E66.9: ICD-10-CM

## 2021-08-06 DIAGNOSIS — Z99.89: ICD-10-CM

## 2021-08-06 DIAGNOSIS — F17.210: ICD-10-CM

## 2021-08-06 DIAGNOSIS — F14.20: ICD-10-CM

## 2021-08-06 DIAGNOSIS — F10.20: Primary | ICD-10-CM

## 2021-08-06 DIAGNOSIS — I10: ICD-10-CM

## 2021-08-06 PROCEDURE — HZ42ZZZ GROUP COUNSELING FOR SUBSTANCE ABUSE TREATMENT, COGNITIVE-BEHAVIORAL: ICD-10-PCS | Performed by: ALLERGY & IMMUNOLOGY

## 2021-08-06 RX ADMIN — LIDOCAINE SCH PATCH: 50 PATCH TOPICAL at 10:13

## 2021-08-06 RX ADMIN — Medication SCH TAB: at 10:13

## 2021-08-06 RX ADMIN — Medication SCH MG: at 22:01

## 2021-08-06 RX ADMIN — METHOCARBAMOL PRN MG: 500 TABLET ORAL at 17:27

## 2021-08-07 RX ADMIN — NICOTINE SCH: 7 PATCH TRANSDERMAL at 11:17

## 2021-08-07 RX ADMIN — NICOTINE PRN MG: 4 INHALANT RESPIRATORY (INHALATION) at 18:38

## 2021-08-07 RX ADMIN — Medication SCH TAB: at 11:17

## 2021-08-07 RX ADMIN — Medication SCH MG: at 21:41

## 2021-08-07 RX ADMIN — LORATADINE SCH MG: 10 TABLET ORAL at 11:17

## 2021-08-07 RX ADMIN — HYDROXYZINE PAMOATE PRN MG: 25 CAPSULE ORAL at 21:42

## 2021-08-07 RX ADMIN — HYDROXYZINE PAMOATE PRN MG: 25 CAPSULE ORAL at 02:55

## 2021-08-08 RX ADMIN — Medication SCH TAB: at 09:53

## 2021-08-08 RX ADMIN — IBUPROFEN PRN MG: 400 TABLET, FILM COATED ORAL at 21:33

## 2021-08-08 RX ADMIN — METHOCARBAMOL PRN MG: 500 TABLET ORAL at 21:33

## 2021-08-08 RX ADMIN — NICOTINE PRN MG: 4 INHALANT RESPIRATORY (INHALATION) at 17:50

## 2021-08-08 RX ADMIN — NICOTINE PRN MG: 4 INHALANT RESPIRATORY (INHALATION) at 12:42

## 2021-08-08 RX ADMIN — METHOCARBAMOL PRN MG: 500 TABLET ORAL at 14:15

## 2021-08-08 RX ADMIN — Medication SCH MG: at 21:31

## 2021-08-08 RX ADMIN — IBUPROFEN PRN MG: 400 TABLET, FILM COATED ORAL at 14:15

## 2021-08-08 RX ADMIN — NICOTINE SCH: 7 PATCH TRANSDERMAL at 09:53

## 2021-08-08 RX ADMIN — LORATADINE SCH MG: 10 TABLET ORAL at 09:53

## 2021-08-09 VITALS — TEMPERATURE: 97.6 F | SYSTOLIC BLOOD PRESSURE: 120 MMHG | DIASTOLIC BLOOD PRESSURE: 77 MMHG | HEART RATE: 88 BPM

## 2021-08-09 RX ADMIN — LORATADINE SCH MG: 10 TABLET ORAL at 10:16

## 2021-08-09 RX ADMIN — IBUPROFEN PRN MG: 400 TABLET, FILM COATED ORAL at 10:16

## 2021-08-09 RX ADMIN — METHOCARBAMOL PRN MG: 500 TABLET ORAL at 10:16

## 2021-08-09 RX ADMIN — Medication SCH TAB: at 10:16

## 2021-08-09 RX ADMIN — NICOTINE SCH: 7 PATCH TRANSDERMAL at 10:42

## 2021-11-01 PROCEDURE — G9005: CPT

## 2022-04-05 ENCOUNTER — HOSPITAL ENCOUNTER (INPATIENT)
Dept: HOSPITAL 74 - YASAS | Age: 52
LOS: 5 days | Discharge: HOME | End: 2022-04-10
Attending: ALLERGY & IMMUNOLOGY | Admitting: ALLERGY & IMMUNOLOGY
Payer: COMMERCIAL

## 2022-04-05 VITALS — BODY MASS INDEX: 31 KG/M2

## 2022-04-05 DIAGNOSIS — F10.230: Primary | ICD-10-CM

## 2022-04-05 DIAGNOSIS — E66.9: ICD-10-CM

## 2022-04-05 DIAGNOSIS — F12.20: ICD-10-CM

## 2022-04-05 DIAGNOSIS — Z91.013: ICD-10-CM

## 2022-04-05 DIAGNOSIS — I10: ICD-10-CM

## 2022-04-05 DIAGNOSIS — M16.0: ICD-10-CM

## 2022-04-05 DIAGNOSIS — J30.2: ICD-10-CM

## 2022-04-05 DIAGNOSIS — F17.210: ICD-10-CM

## 2022-04-05 DIAGNOSIS — Z99.89: ICD-10-CM

## 2022-04-05 DIAGNOSIS — F14.20: ICD-10-CM

## 2022-04-05 PROCEDURE — U0005 INFEC AGEN DETEC AMPLI PROBE: HCPCS

## 2022-04-05 PROCEDURE — U0003 INFECTIOUS AGENT DETECTION BY NUCLEIC ACID (DNA OR RNA); SEVERE ACUTE RESPIRATORY SYNDROME CORONAVIRUS 2 (SARS-COV-2) (CORONAVIRUS DISEASE [COVID-19]), AMPLIFIED PROBE TECHNIQUE, MAKING USE OF HIGH THROUGHPUT TECHNOLOGIES AS DESCRIBED BY CMS-2020-01-R: HCPCS

## 2022-04-06 LAB
ALBUMIN SERPL-MCNC: 3.6 G/DL (ref 3.4–5)
ALP SERPL-CCNC: 84 U/L (ref 45–117)
ALT SERPL-CCNC: 36 U/L (ref 13–61)
ANION GAP SERPL CALC-SCNC: 8 MMOL/L (ref 8–16)
AST SERPL-CCNC: 25 U/L (ref 15–37)
BILIRUB SERPL-MCNC: 0.6 MG/DL (ref 0.2–1)
BUN SERPL-MCNC: 12.8 MG/DL (ref 7–18)
CALCIUM SERPL-MCNC: 9.1 MG/DL (ref 8.5–10.1)
CHLORIDE SERPL-SCNC: 105 MMOL/L (ref 98–107)
CO2 SERPL-SCNC: 28 MMOL/L (ref 21–32)
CREAT SERPL-MCNC: 1.1 MG/DL (ref 0.55–1.3)
DEPRECATED RDW RBC AUTO: 13.5 % (ref 11.9–15.9)
GLUCOSE SERPL-MCNC: 84 MG/DL (ref 74–106)
HCT VFR BLD CALC: 43.2 % (ref 35.4–49)
HGB BLD-MCNC: 14.4 GM/DL (ref 11.7–16.9)
MCH RBC QN AUTO: 32.9 PG (ref 25.7–33.7)
MCHC RBC AUTO-ENTMCNC: 33.4 G/DL (ref 32–35.9)
MCV RBC: 98.5 FL (ref 80–96)
PLATELET # BLD AUTO: 409 10^3/UL (ref 134–434)
PMV BLD: 7.5 FL (ref 7.5–11.1)
PROT SERPL-MCNC: 6.9 G/DL (ref 6.4–8.2)
RBC # BLD AUTO: 4.38 M/MM3 (ref 4–5.6)
SODIUM SERPL-SCNC: 140 MMOL/L (ref 136–145)
WBC # BLD AUTO: 6.4 K/MM3 (ref 4–10)

## 2022-04-06 PROCEDURE — HZ2ZZZZ DETOXIFICATION SERVICES FOR SUBSTANCE ABUSE TREATMENT: ICD-10-PCS | Performed by: ALLERGY & IMMUNOLOGY

## 2022-04-06 RX ADMIN — Medication SCH MG: at 22:44

## 2022-04-06 RX ADMIN — Medication SCH TAB: at 10:22

## 2022-04-06 RX ADMIN — NICOTINE PRN MG: 4 INHALANT RESPIRATORY (INHALATION) at 11:15

## 2022-04-06 RX ADMIN — METHOCARBAMOL PRN MG: 500 TABLET ORAL at 22:44

## 2022-04-06 RX ADMIN — NICOTINE SCH: 14 PATCH, EXTENDED RELEASE TRANSDERMAL at 10:23

## 2022-04-07 RX ADMIN — NICOTINE SCH: 14 PATCH, EXTENDED RELEASE TRANSDERMAL at 10:23

## 2022-04-07 RX ADMIN — Medication SCH MG: at 22:49

## 2022-04-07 RX ADMIN — METHOCARBAMOL PRN MG: 500 TABLET ORAL at 22:49

## 2022-04-07 RX ADMIN — Medication SCH TAB: at 10:21

## 2022-04-08 RX ADMIN — Medication SCH MG: at 22:51

## 2022-04-08 RX ADMIN — LORATADINE SCH MG: 10 TABLET ORAL at 10:57

## 2022-04-08 RX ADMIN — NICOTINE PRN MG: 4 INHALANT RESPIRATORY (INHALATION) at 18:35

## 2022-04-08 RX ADMIN — NICOTINE PRN MG: 4 INHALANT RESPIRATORY (INHALATION) at 10:57

## 2022-04-08 RX ADMIN — Medication SCH TAB: at 10:03

## 2022-04-08 RX ADMIN — NICOTINE SCH: 14 PATCH, EXTENDED RELEASE TRANSDERMAL at 10:07

## 2022-04-09 RX ADMIN — Medication SCH: at 23:18

## 2022-04-09 RX ADMIN — Medication SCH TAB: at 09:25

## 2022-04-09 RX ADMIN — LORATADINE SCH MG: 10 TABLET ORAL at 09:25

## 2022-04-09 RX ADMIN — NICOTINE SCH: 14 PATCH, EXTENDED RELEASE TRANSDERMAL at 09:26

## 2022-04-10 VITALS — TEMPERATURE: 97.4 F | SYSTOLIC BLOOD PRESSURE: 138 MMHG | DIASTOLIC BLOOD PRESSURE: 99 MMHG | HEART RATE: 77 BPM

## 2022-04-10 RX ADMIN — NICOTINE PRN MG: 4 INHALANT RESPIRATORY (INHALATION) at 06:00
